# Patient Record
Sex: FEMALE | Race: WHITE | HISPANIC OR LATINO | Employment: UNEMPLOYED | URBAN - NONMETROPOLITAN AREA
[De-identification: names, ages, dates, MRNs, and addresses within clinical notes are randomized per-mention and may not be internally consistent; named-entity substitution may affect disease eponyms.]

---

## 2017-04-11 ENCOUNTER — HOSPITAL ENCOUNTER (EMERGENCY)
Facility: HOSPITAL | Age: 27
Discharge: HOME | End: 2017-04-11
Attending: EMERGENCY MEDICINE | Admitting: EMERGENCY MEDICINE
Payer: COMMERCIAL

## 2017-04-11 ENCOUNTER — APPOINTMENT (EMERGENCY)
Dept: RADIOLOGY | Facility: HOSPITAL | Age: 27
End: 2017-04-11
Payer: COMMERCIAL

## 2017-04-11 VITALS
WEIGHT: 173 LBS | RESPIRATION RATE: 18 BRPM | TEMPERATURE: 98.9 F | HEIGHT: 61 IN | DIASTOLIC BLOOD PRESSURE: 70 MMHG | SYSTOLIC BLOOD PRESSURE: 126 MMHG | BODY MASS INDEX: 32.66 KG/M2 | HEART RATE: 82 BPM | OXYGEN SATURATION: 99 %

## 2017-04-11 DIAGNOSIS — S93.402A LEFT ANKLE SPRAIN: Primary | ICD-10-CM

## 2017-04-11 PROCEDURE — 73610 X-RAY EXAM OF ANKLE: CPT

## 2017-04-11 PROCEDURE — 99283 EMERGENCY DEPT VISIT LOW MDM: CPT

## 2017-04-11 RX ORDER — NAPROXEN 500 MG/1
500 TABLET ORAL 2 TIMES DAILY WITH MEALS
Qty: 14 TABLET | Refills: 0 | Status: SHIPPED | OUTPATIENT
Start: 2017-04-11 | End: 2018-02-01 | Stop reason: ALTCHOICE

## 2017-04-11 RX ORDER — NAPROXEN 250 MG/1
500 TABLET ORAL ONCE
Status: COMPLETED | OUTPATIENT
Start: 2017-04-11 | End: 2017-04-11

## 2017-04-11 RX ADMIN — NAPROXEN 500 MG: 250 TABLET ORAL at 21:16

## 2017-07-28 ENCOUNTER — APPOINTMENT (OUTPATIENT)
Dept: LAB | Facility: CLINIC | Age: 27
End: 2017-07-28
Payer: COMMERCIAL

## 2017-07-28 ENCOUNTER — TRANSCRIBE ORDERS (OUTPATIENT)
Dept: LAB | Facility: CLINIC | Age: 27
End: 2017-07-28

## 2017-07-28 ENCOUNTER — ALLSCRIPTS OFFICE VISIT (OUTPATIENT)
Dept: OTHER | Facility: OTHER | Age: 27
End: 2017-07-28

## 2017-07-28 DIAGNOSIS — R10.2 PELVIC AND PERINEAL PAIN: ICD-10-CM

## 2017-07-28 DIAGNOSIS — K59.09 OTHER CONSTIPATION: ICD-10-CM

## 2017-07-28 DIAGNOSIS — Z00.00 ENCOUNTER FOR GENERAL ADULT MEDICAL EXAMINATION WITHOUT ABNORMAL FINDINGS: ICD-10-CM

## 2017-07-28 DIAGNOSIS — R39.11 HESITANCY OF MICTURITION: ICD-10-CM

## 2017-07-28 DIAGNOSIS — Z34.91 ENCOUNTER FOR SUPERVISION OF NORMAL PREGNANCY IN FIRST TRIMESTER: ICD-10-CM

## 2017-07-28 LAB
ALBUMIN SERPL BCP-MCNC: 3.7 G/DL (ref 3.5–5)
ALP SERPL-CCNC: 67 U/L (ref 46–116)
ALT SERPL W P-5'-P-CCNC: 19 U/L (ref 12–78)
ANION GAP SERPL CALCULATED.3IONS-SCNC: 8 MMOL/L (ref 4–13)
AST SERPL W P-5'-P-CCNC: 8 U/L (ref 5–45)
BASOPHILS # BLD AUTO: 0.03 THOUSANDS/ΜL (ref 0–0.1)
BASOPHILS NFR BLD AUTO: 0 % (ref 0–1)
BILIRUB SERPL-MCNC: 0.54 MG/DL (ref 0.2–1)
BILIRUB UR QL STRIP: NEGATIVE
BUN SERPL-MCNC: 7 MG/DL (ref 5–25)
CALCIUM SERPL-MCNC: 9 MG/DL (ref 8.3–10.1)
CHLORIDE SERPL-SCNC: 107 MMOL/L (ref 100–108)
CHOLEST SERPL-MCNC: 136 MG/DL (ref 50–200)
CLARITY UR: NORMAL
CO2 SERPL-SCNC: 23 MMOL/L (ref 21–32)
COLOR UR: YELLOW
CREAT SERPL-MCNC: 0.7 MG/DL (ref 0.6–1.3)
EOSINOPHIL # BLD AUTO: 0.05 THOUSAND/ΜL (ref 0–0.61)
EOSINOPHIL NFR BLD AUTO: 1 % (ref 0–6)
ERYTHROCYTE [DISTWIDTH] IN BLOOD BY AUTOMATED COUNT: 12.3 % (ref 11.6–15.1)
GFR SERPL CREATININE-BSD FRML MDRD: 122 ML/MIN/1.73SQ M
GLUCOSE (HISTORICAL): NEGATIVE
GLUCOSE P FAST SERPL-MCNC: 88 MG/DL (ref 65–99)
HCT VFR BLD AUTO: 39.7 % (ref 34.8–46.1)
HDLC SERPL-MCNC: 40 MG/DL (ref 40–60)
HGB BLD-MCNC: 13.2 G/DL (ref 11.5–15.4)
HGB UR QL STRIP.AUTO: NEGATIVE
KETONES UR STRIP-MCNC: NEGATIVE MG/DL
LDLC SERPL CALC-MCNC: 84 MG/DL (ref 0–100)
LEUKOCYTE ESTERASE UR QL STRIP: NEGATIVE
LYMPHOCYTES # BLD AUTO: 3.07 THOUSANDS/ΜL (ref 0.6–4.47)
LYMPHOCYTES NFR BLD AUTO: 33 % (ref 14–44)
MCH RBC QN AUTO: 27.9 PG (ref 26.8–34.3)
MCHC RBC AUTO-ENTMCNC: 33.2 G/DL (ref 31.4–37.4)
MCV RBC AUTO: 84 FL (ref 82–98)
MONOCYTES # BLD AUTO: 0.69 THOUSAND/ΜL (ref 0.17–1.22)
MONOCYTES NFR BLD AUTO: 7 % (ref 4–12)
NEUTROPHILS # BLD AUTO: 5.59 THOUSANDS/ΜL (ref 1.85–7.62)
NEUTS SEG NFR BLD AUTO: 59 % (ref 43–75)
NITRITE UR QL STRIP: NEGATIVE
NRBC BLD AUTO-RTO: 0 /100 WBCS
PH UR STRIP.AUTO: 6 [PH]
PLATELET # BLD AUTO: 291 THOUSANDS/UL (ref 149–390)
PMV BLD AUTO: 11.5 FL (ref 8.9–12.7)
POTASSIUM SERPL-SCNC: 3.8 MMOL/L (ref 3.5–5.3)
PROT SERPL-MCNC: 6.9 G/DL (ref 6.4–8.2)
PROT UR STRIP-MCNC: POSITIVE MG/DL
RBC # BLD AUTO: 4.73 MILLION/UL (ref 3.81–5.12)
SODIUM SERPL-SCNC: 138 MMOL/L (ref 136–145)
SP GR UR STRIP.AUTO: 1.03
TRIGL SERPL-MCNC: 60 MG/DL
TSH SERPL DL<=0.05 MIU/L-ACNC: 0.47 UIU/ML (ref 0.36–3.74)
UROBILINOGEN UR QL STRIP.AUTO: 0.2
WBC # BLD AUTO: 9.44 THOUSAND/UL (ref 4.31–10.16)

## 2017-07-28 PROCEDURE — 84443 ASSAY THYROID STIM HORMONE: CPT

## 2017-07-28 PROCEDURE — 80061 LIPID PANEL: CPT

## 2017-07-28 PROCEDURE — 85025 COMPLETE CBC W/AUTO DIFF WBC: CPT

## 2017-07-28 PROCEDURE — 80053 COMPREHEN METABOLIC PANEL: CPT

## 2017-07-28 PROCEDURE — 36415 COLL VENOUS BLD VENIPUNCTURE: CPT

## 2017-07-29 ENCOUNTER — GENERIC CONVERSION - ENCOUNTER (OUTPATIENT)
Dept: OTHER | Facility: OTHER | Age: 27
End: 2017-07-29

## 2017-08-14 ENCOUNTER — ALLSCRIPTS OFFICE VISIT (OUTPATIENT)
Dept: OTHER | Facility: OTHER | Age: 27
End: 2017-08-14

## 2017-08-18 ENCOUNTER — GENERIC CONVERSION - ENCOUNTER (OUTPATIENT)
Dept: OTHER | Facility: OTHER | Age: 27
End: 2017-08-18

## 2017-08-21 ENCOUNTER — ALLSCRIPTS OFFICE VISIT (OUTPATIENT)
Dept: OTHER | Facility: OTHER | Age: 27
End: 2017-08-21

## 2017-08-21 ENCOUNTER — GENERIC CONVERSION - ENCOUNTER (OUTPATIENT)
Dept: OTHER | Facility: OTHER | Age: 27
End: 2017-08-21

## 2017-08-24 ENCOUNTER — TRANSCRIBE ORDERS (OUTPATIENT)
Dept: LAB | Facility: CLINIC | Age: 27
End: 2017-08-24

## 2017-08-24 ENCOUNTER — APPOINTMENT (OUTPATIENT)
Dept: LAB | Facility: CLINIC | Age: 27
End: 2017-08-24
Payer: COMMERCIAL

## 2017-08-24 DIAGNOSIS — Z34.91 ENCOUNTER FOR SUPERVISION OF NORMAL PREGNANCY IN FIRST TRIMESTER: ICD-10-CM

## 2017-08-24 LAB
AMORPH URATE CRY URNS QL MICRO: ABNORMAL /HPF
BACTERIA UR QL AUTO: ABNORMAL /HPF
BASOPHILS # BLD AUTO: 0.02 THOUSANDS/ΜL (ref 0–0.1)
BASOPHILS NFR BLD AUTO: 0 % (ref 0–1)
BILIRUB UR QL STRIP: ABNORMAL
CLARITY UR: ABNORMAL
COLOR UR: ABNORMAL
EOSINOPHIL # BLD AUTO: 0.05 THOUSAND/ΜL (ref 0–0.61)
EOSINOPHIL NFR BLD AUTO: 1 % (ref 0–6)
ERYTHROCYTE [DISTWIDTH] IN BLOOD BY AUTOMATED COUNT: 12.1 % (ref 11.6–15.1)
GLUCOSE UR STRIP-MCNC: NEGATIVE MG/DL
HCT VFR BLD AUTO: 37.4 % (ref 34.8–46.1)
HGB BLD-MCNC: 12.7 G/DL (ref 11.5–15.4)
HGB UR QL STRIP.AUTO: NEGATIVE
KETONES UR STRIP-MCNC: ABNORMAL MG/DL
LEUKOCYTE ESTERASE UR QL STRIP: NEGATIVE
LYMPHOCYTES # BLD AUTO: 2.62 THOUSANDS/ΜL (ref 0.6–4.47)
LYMPHOCYTES NFR BLD AUTO: 25 % (ref 14–44)
MCH RBC QN AUTO: 28 PG (ref 26.8–34.3)
MCHC RBC AUTO-ENTMCNC: 34 G/DL (ref 31.4–37.4)
MCV RBC AUTO: 82 FL (ref 82–98)
MONOCYTES # BLD AUTO: 0.85 THOUSAND/ΜL (ref 0.17–1.22)
MONOCYTES NFR BLD AUTO: 8 % (ref 4–12)
MUCOUS THREADS UR QL AUTO: ABNORMAL
NEUTROPHILS # BLD AUTO: 6.95 THOUSANDS/ΜL (ref 1.85–7.62)
NEUTS SEG NFR BLD AUTO: 66 % (ref 43–75)
NITRITE UR QL STRIP: NEGATIVE
NON-SQ EPI CELLS URNS QL MICRO: ABNORMAL /HPF
NRBC BLD AUTO-RTO: 0 /100 WBCS
PH UR STRIP.AUTO: 6 [PH] (ref 4.5–8)
PLATELET # BLD AUTO: 280 THOUSANDS/UL (ref 149–390)
PMV BLD AUTO: 11.4 FL (ref 8.9–12.7)
PROT UR STRIP-MCNC: ABNORMAL MG/DL
RBC # BLD AUTO: 4.54 MILLION/UL (ref 3.81–5.12)
RBC #/AREA URNS AUTO: ABNORMAL /HPF
RUBV IGG SERPL IA-ACNC: 36.5 IU/ML
SP GR UR STRIP.AUTO: 1.03 (ref 1–1.03)
UROBILINOGEN UR QL STRIP.AUTO: 1 E.U./DL
WBC # BLD AUTO: 10.51 THOUSAND/UL (ref 4.31–10.16)
WBC #/AREA URNS AUTO: ABNORMAL /HPF

## 2017-08-24 PROCEDURE — 36415 COLL VENOUS BLD VENIPUNCTURE: CPT

## 2017-08-24 PROCEDURE — 81220 CFTR GENE COM VARIANTS: CPT

## 2017-08-24 PROCEDURE — 80081 OBSTETRIC PANEL INC HIV TSTG: CPT

## 2017-08-24 PROCEDURE — 87086 URINE CULTURE/COLONY COUNT: CPT

## 2017-08-24 PROCEDURE — 81001 URINALYSIS AUTO W/SCOPE: CPT

## 2017-08-25 ENCOUNTER — LAB REQUISITION (OUTPATIENT)
Dept: LAB | Facility: HOSPITAL | Age: 27
End: 2017-08-25
Payer: COMMERCIAL

## 2017-08-25 DIAGNOSIS — Z34.91 ENCOUNTER FOR SUPERVISION OF NORMAL PREGNANCY IN FIRST TRIMESTER: ICD-10-CM

## 2017-08-25 LAB
ABO GROUP BLD: NORMAL
BACTERIA UR CULT: NORMAL
BLD GP AB SCN SERPL QL: NEGATIVE
HBV SURFACE AG SER QL: NORMAL
RH BLD: POSITIVE
RPR SER QL: NORMAL
SPECIMEN EXPIRATION DATE: NORMAL

## 2017-08-27 LAB — HIV 1+2 AB+HIV1 P24 AG SERPL QL IA: NORMAL

## 2017-08-30 LAB
CF COMMENT: NORMAL
CFTR MUT ANL BLD/T: NORMAL

## 2017-08-31 ENCOUNTER — ALLSCRIPTS OFFICE VISIT (OUTPATIENT)
Dept: OTHER | Facility: OTHER | Age: 27
End: 2017-08-31

## 2017-08-31 LAB
GLUCOSE (HISTORICAL): NORMAL
PROT UR STRIP-MCNC: NORMAL MG/DL

## 2017-09-01 ENCOUNTER — LAB REQUISITION (OUTPATIENT)
Dept: LAB | Facility: HOSPITAL | Age: 27
End: 2017-09-01
Payer: COMMERCIAL

## 2017-09-01 DIAGNOSIS — Z72.51 HIGH RISK HETEROSEXUAL BEHAVIOR: ICD-10-CM

## 2017-09-01 LAB
CHLAMYDIA DNA CVX QL NAA+PROBE: NORMAL
N GONORRHOEA DNA GENITAL QL NAA+PROBE: NORMAL

## 2017-09-01 PROCEDURE — 87591 N.GONORRHOEAE DNA AMP PROB: CPT | Performed by: OBSTETRICS & GYNECOLOGY

## 2017-09-01 PROCEDURE — 87491 CHLMYD TRACH DNA AMP PROBE: CPT | Performed by: OBSTETRICS & GYNECOLOGY

## 2017-09-06 ENCOUNTER — GENERIC CONVERSION - ENCOUNTER (OUTPATIENT)
Dept: OTHER | Facility: OTHER | Age: 27
End: 2017-09-06

## 2017-09-20 ENCOUNTER — GENERIC CONVERSION - ENCOUNTER (OUTPATIENT)
Dept: OTHER | Facility: OTHER | Age: 27
End: 2017-09-20

## 2017-09-20 ENCOUNTER — ALLSCRIPTS OFFICE VISIT (OUTPATIENT)
Dept: PERINATAL CARE | Facility: CLINIC | Age: 27
End: 2017-09-20
Payer: COMMERCIAL

## 2017-09-20 PROCEDURE — 76813 OB US NUCHAL MEAS 1 GEST: CPT | Performed by: OBSTETRICS & GYNECOLOGY

## 2017-09-27 ENCOUNTER — LAB CONVERSION - ENCOUNTER (OUTPATIENT)
Dept: OTHER | Facility: OTHER | Age: 27
End: 2017-09-27

## 2017-09-27 LAB
AGE RISK DOWN SYNDROME (HISTORICAL): NORMAL
CALC'D GESTATIONAL AGE (HISTORICAL): 13.4
COLLECTION DATE (HISTORICAL): NORMAL
CROWN RUMP LENGTH (HISTORICAL): 72 MM
CROWN RUMP LENGTH (HISTORICAL): NORMAL MM
DATE OF BIRTH (HISTORICAL): NORMAL
DONOR AGE; EGG RETRIEVAL (HISTORICAL): NORMAL
DONOR EGG (HISTORICAL): NO
EDD DETERMINED BY (HISTORICAL): NORMAL
ESTIMATED DELIVERY DATE (EDD) (HISTORICAL): NORMAL
HCG MOM (HISTORICAL): 0.41
HCG QUANTITATIVE (HISTORICAL): 28.4 IU/ML
HX OF NEURAL TUBE DEFECTS (HISTORICAL): NO
IF TWINS (HISTORICAL): NORMAL
INSULIN DEP. DIABETIC (HISTORICAL): NO
INTERPRETATION (HISTORICAL): NORMAL
MATERNAL WEIGHT (HISTORICAL): 160 LBS
MSS DOWN SYNDROME RISK (HISTORICAL): NORMAL
MSS3 TRISOMY 18 RISK (HISTORICAL): NORMAL
NASAL BONE (HISTORICAL): NORMAL
NASAL BONE (HISTORICAL): PRESENT
NT MOM (HISTORICAL): 1.32
NTQR LOCATION ID (HISTORICAL): NORMAL
NTQR READING PHYS ID (HISTORICAL): NORMAL
NUCHAL TRANSLUCENCY (HISTORICAL): 2.1 MM
NUCHAL TRANSLUCENCY (HISTORICAL): NORMAL MM
NUMBER OF FETUSES (HISTORICAL): 1
PAPP-A (HISTORICAL): 1.04
PAPP-A (HISTORICAL): 1173.5 NG/ML
PREV PREGNANCY DOWN SYND (HISTORICAL): NO
RACE/ETHNIC ORIGIN (HISTORICAL): NORMAL
REFERRING PHYSICIAN (HISTORICAL): NORMAL
REFERRING PHYSICIAN NPI (HISTORICAL): NORMAL
REFERRING PHYSICIAN PHONE (HISTORICAL): NORMAL
REPEAT SPECIMEN (HISTORICAL): NO
ULTRASONOGRAPHER ID (HISTORICAL): NORMAL
ULTRASOUND DATE (HISTORICAL): NORMAL

## 2017-09-28 ENCOUNTER — GENERIC CONVERSION - ENCOUNTER (OUTPATIENT)
Dept: OTHER | Facility: OTHER | Age: 27
End: 2017-09-28

## 2017-10-04 ENCOUNTER — GENERIC CONVERSION - ENCOUNTER (OUTPATIENT)
Dept: OTHER | Facility: OTHER | Age: 27
End: 2017-10-04

## 2017-10-26 ENCOUNTER — GENERIC CONVERSION - ENCOUNTER (OUTPATIENT)
Dept: OTHER | Facility: OTHER | Age: 27
End: 2017-10-26

## 2017-11-09 ENCOUNTER — APPOINTMENT (OUTPATIENT)
Dept: PERINATAL CARE | Facility: CLINIC | Age: 27
End: 2017-11-09
Payer: COMMERCIAL

## 2017-11-09 ENCOUNTER — GENERIC CONVERSION - ENCOUNTER (OUTPATIENT)
Dept: OTHER | Facility: OTHER | Age: 27
End: 2017-11-09

## 2017-11-09 PROCEDURE — 76817 TRANSVAGINAL US OBSTETRIC: CPT | Performed by: OBSTETRICS & GYNECOLOGY

## 2017-11-09 PROCEDURE — 76811 OB US DETAILED SNGL FETUS: CPT | Performed by: OBSTETRICS & GYNECOLOGY

## 2017-11-10 ENCOUNTER — GENERIC CONVERSION - ENCOUNTER (OUTPATIENT)
Dept: OTHER | Facility: OTHER | Age: 27
End: 2017-11-10

## 2017-11-10 ENCOUNTER — LAB CONVERSION - ENCOUNTER (OUTPATIENT)
Dept: OTHER | Facility: OTHER | Age: 27
End: 2017-11-10

## 2017-11-10 LAB
AFP (HISTORICAL): 1.34
AFP (HISTORICAL): 71 NG/ML
AGE RISK DOWN SYNDROME (HISTORICAL): NORMAL
CALC'D GESTATIONAL AGE (HISTORICAL): 20
COLLECTION DATE (HISTORICAL): NORMAL
CROWN RUMP LENGTH (HISTORICAL): 72 MM
DATE OF BIRTH (HISTORICAL): NORMAL
ESTIMATED DELIVERY DATE (EDD) (HISTORICAL): NORMAL
ESTRADIOL, FREE (HISTORICAL): 2.86 NG/ML
ESTRIOL MOM (HISTORICAL): 1.62
HCG MOM (HISTORICAL): 0.41
HCG QUANTITATIVE (HISTORICAL): 7.8 IU/ML
HX OF NEURAL TUBE DEFECTS (HISTORICAL): NO
INHIBIN A (HISTORICAL): 187 PG/ML
INHIBIN A MOM (HISTORICAL): 1.15
INSULIN DEP. DIABETIC (HISTORICAL): NO
INTERPRETATION (HISTORICAL): NORMAL
MATERNAL WEIGHT (HISTORICAL): 162 LBS
MSAFP RISK OPEN NTD (HISTORICAL): NORMAL
MSS DOWN SYNDROME RISK (HISTORICAL): NORMAL
MSS3 TRISOMY 18 RISK (HISTORICAL): NORMAL
NASAL BONE (HISTORICAL): NORMAL
NASAL BONE (HISTORICAL): PRESENT
NT MOM (HISTORICAL): 1.32
NUCHAL TRANSLUCENCY (HISTORICAL): 2.1 MM
NUMBER OF FETUSES (HISTORICAL): 1
PAPP-A (HISTORICAL): 1.06
PAPP-A (HISTORICAL): 1173.5 NG/ML
RACE/ETHNIC ORIGIN (HISTORICAL): NORMAL
REFERRING PHYSICIAN (HISTORICAL): NORMAL
REFERRING PHYSICIAN NPI (HISTORICAL): NORMAL
REFERRING PHYSICIAN PHONE (HISTORICAL): NORMAL
REPEAT SPECIMEN (HISTORICAL): NO
SPECIMEN: (HISTORICAL): NORMAL
ULTRASOUND DATE (HISTORICAL): NORMAL

## 2017-11-22 ENCOUNTER — ALLSCRIPTS OFFICE VISIT (OUTPATIENT)
Dept: OTHER | Facility: OTHER | Age: 27
End: 2017-11-22

## 2017-11-22 LAB
GLUCOSE (HISTORICAL): NORMAL
PROT UR STRIP-MCNC: NORMAL MG/DL

## 2017-12-20 ENCOUNTER — ALLSCRIPTS OFFICE VISIT (OUTPATIENT)
Dept: OTHER | Facility: OTHER | Age: 27
End: 2017-12-20

## 2017-12-20 LAB
GLUCOSE (HISTORICAL): NORMAL
PROT UR STRIP-MCNC: 1 MG/DL

## 2018-01-09 NOTE — MISCELLANEOUS
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

## 2018-01-10 NOTE — RESULT NOTES
Verified Results  (Q) STEPWISE, PART 2 [id number] 12:03PM Bhavani Ramirez   REPORT COMMENT:  FASTING:NO     Test Name Result Flag Reference   INTERPRETATION SEE NOTE     SCREEN NEGATIVE FOR OPEN NTD, DOWN SYNDROME AND TRISOMY 18   NT WAS USED IN THE RISK CALCULATIONS  RISK FOR ONTD 1:3400     AGE RISK DOWN SYNDROME 1:1000     KUSUM DOWN SYNDROME RISK <1:5000  <1:270   RISK FOR TRISOMY 18 <1:5000  <1:100   CALCULATED GESTATIONAL$AGE 20 0     Crown rump length (CRL) was used to calculate gestational  age  LIANA, if provided, was not used for gestational age  dating  AFP, SERUM 71 0 ng/mL     AFP MOM 1 34     Reference Range:                                        <2 50                                        IDD        <1 90                                        TWINS      <4 00                                        TWINS IDD  <3 50                                        TRIPLETS   <4 50   HCG, SERUM 7 8 IU/mL     HCG MOM 0 41     ESTRIOL, FREE 2 86 ng/mL     ESTRIOL MOM 1 62     INHIBIN A, DIMERIC 187 pg/mL     INHIBIN A MOM 1 15     CLARENCE A 1173 5 ng/mL     This test was performed using a kit that has not been  cleared or approved by the FDA  The analytical performance  characteristics of this test have been determined by [facility]  This test  should not be used for diagnosis without confirmation by  other medically established means  CLARENCE-A MOM 1 06     NT MOM 1 32     The maternal serum screening results indicate a lower risk  of trisomy 21 in this pregnancy  The nasal bone was assessed  via ultrasound and was present  The combined risk is  therefore likely to be less than the calculated risk  Other  findings later in the pregnancy may change the risk  Nasal bone assessment is best accomplished through a fetal  ultrasound performed between 11 weeks 0 days through 13  weeks 6 days   In assessing the risk for aneuploidy, the  evaluation of the maternal serum markers plus the nuchal  thickness measurement is calculated first  Any potential  change to the patient's risk for aneuploidy depends on the  nuchal thickness, crown-rump length, and the ethnic origin,  and therefore the values generated by the algorithm itself  will not change  Additional information about the assessment  of the fetal nasal bone may be found on the [facility] [facility] website at  http://www  [employer]fmf/training-certification/cert  erdqaxma-zc-utxzo  tence/11-13-week-scan/assessment-of-the-nasal-bone/  The Sequential Integrated Screen combines CLARENCE-A and hCG  with or without a nuchal translucency measurement in the  first trimester with AFP, unconjugated estriol, intact hCG  and Inhibin A in the second trimester  This provides a  useful screening test for detection of open neural tube  defects, Down syndrome and Trisomy 18  It should be noted  that normal results can never guarantee the birth of a  normal baby and that 2 to 3 percent of newborns have some  type of physical or mental defect, many of which are  undetectable through any known prenatal diagnostic  technique  REFERRING PHYSICIAN NAME [facility]     REFERRING PHYSICIAN PHONE [phone]     REFERRING PHYSICIAN NPI [id number]     SPECIMEN # FROM PART 1 [id number]     DATE OF BIRTH 1990     COLLECTION DATE 11/07/2017     LIANA: 03/23/2018     NUCHAL TRANSLUCENCY 2 1 mm     MOTHER'S ETHNIC ORIGIN      INSULIN DEPEND DIABETIC NO     REPEAT SPECIMEN NO     NUMBER OF FETUSES 1     HX OF NEURAL TUBE DEFECTS NO     MATERNAL WEIGHT 162 lbs     Crown Rump Length 72 mm     Ultrasound Date 09/20/2017     Nasal Bone PRESENT     Twin B Nasal Bone NOT GIVEN     For additional information, please refer to  http://[employer]/faq/FAQ95  (This link is provided for more informational/educational  purposes only )     This is a screening test, not a diagnostic test      This risk assessment is based on demographic data provided  by the ordering physician  Please notify the laboratory  promptly if any data are incorrect  It has been observed that patients who smoke cigarettes  during pregnancy may have a slightly increased risk of  having a false positive KUSUM screen for Down Syndrome or  trisomy 18  If you have questions concerning this report: For clinical consultation, call [phone]; For technical questions, call [phone] ext [phone]; For recalculations, fax to [phone]

## 2018-01-11 NOTE — PROGRESS NOTES
SEP 20 2017         RE: Ronan Tejeda                                To: YESICA BOOGIE [facility]   MR#: [id number]                                    [address] [place] [address]   : SEP [address] Gisselle Angeles  ENC: [id number]                             [place], [address]   (Exam #: [id number])                           Fax: [phone]      The LMP of this 24year old,  G3, P1-0-1-1 patient was unknown, her   working LIANA is MAR 21 2018 and the current gestational age is 17 weeks 5   days by [address] [place] [address]  A sonographic examination was performed on SEP   20 2017 using real time equipment  The ultrasound examination was   performed using abdominal technique  The patient has a BMI of 31 4  Her   blood pressure today was 132/67  Earliest US on record:   17  10w6d   18  LIANA      Christoph Mccullough presents today for sequential screening  This is her third   pregnancy  She has a history of one termination and a previous full-term   vaginal delivery without complications  She has no significant medical or   surgical history otherwise  She denies the current use of tobacco,   alcohol, or drugs  She currently takes no medications and has no known   drug allergies  Her family medical history is unremarkable  A review of   systems is otherwise negative  On exam, the patient appears well, in no   acute distress, and her abdomen is nontender  Multiple longitudinal and transverse sections revealed a mtz   intrauterine pregnancy with the fetus in variable presentation  The   placenta is posterior in implantation, and there is no placenta previa        Cardiac motion was observed at 152 bpm       INDICATIONS      first trimester genetic screening   obesity      Exam Types      Level I      RESULTS      Fetus # 1 of 1   Variable presentation      MEASUREMENTS (* Included In Average GA)      CRL              7 2 cm        13 weeks 1 day *   Nuchal Trans    2 10 mm      THE AVERAGE GESTATIONAL AGE is 13 weeks 1 day +/- 7 days  ANATOMY COMMENTS      Anatomic detail is limited at this gestational age  The yolk sac was not   noted  The fetal cranium appeared normal in shape and the nuchal   translucency was normal in size (2 1mm)  The nasal bone appears to be   present  The intracranial anatomy was unremarkable  Evaluation of the   spine revealed no obvious evidence for a neural tube defect  Anatomy of   the fetal thorax appeared within normal limits  The cardiac rhythm was   regular  Within the abdomen, stomach & bladder were visualized and the   abdominal wall appeared intact  A three vessel cord appears to be present  Active movement of the fetal body & extremities was seen  There is no   suspicion of a subchorionic bleed  The placental cord insertion was   normal     There is no suspicion of a uterine myoma  Free fluid is not   seen in the posterior cul-de-sac  ADNEXA      The left ovary appeared normal and measured 3 1 x 3 2 x 2 6 cm with a   volume of 13 5 cc  The right ovary appeared normal and measured 2 3 x 2 8   x 1 2 cm with a volume of 4 0 cc       AMNIOTIC FLUID      Amniotic Fluid: Normal      IMPRESSION      Santos IUP   13 weeks and 1 day by this ultrasound  (LIANA=MAR 27 2018)   13 weeks and 5 days by 1st Tri Sono  (LIANA=MAR 23 2018)   Variable presentation   Regular fetal heart rate of 152 bpm   Posterior placenta   No placenta previa      GENERAL COMMENT      We discussed the options for genetic screening, including but not limited   to first trimester screening, second trimester screening, combined first   and second trimester screening, noninvasive prenatal testing (NIPT) for   patients at high risk and diagnostic screening through the use of CVS and   amniocentesis    We discussed the risks and benefits of each approach   including the sensitivities and false positive rates as well as the difference between a screening test and a diagnostic test   At the   conclusion of our discussion the patient elected Stepwise Sequential   Screening to delineate her risk for fetal aneuploidy  She was given a   requisition to go to [facility] to have the first trimester blood work drawn  The first trimester portion of the screening results, encompassing age,   nuchal translucency, and biochemistry should be available within one week   of testing and will be reported from [facility]   The second stage of   sequential screening should be completed between the 15th and 21st week of   pregnancy (ideally between 16-18 weeks)  We discussed follow-up in detail and I recommend an anatomy ultrasound be   scheduled for 20 weeks gestation  Thank you very much for allowing us to participate in the care of this   very nice patient  Should you have any questions, please do not hesitate   to contact our office  Please note, in addition to the time spent discussing the results of the   ultrasound, I spent approximately 10 minutes of face-to-face time with the   patient, greater than 50% of which was spent in counseling and the   coordination of care for this patient  Portions of the record may have been created with voice recognition   software  Occasional wrong word or "sound a like" substitutions may have   occurred due to the inherent limitations of voice recognition software  Read the chart carefully and recognize, using context, where substitutions   have occurred  WILLEM Steiner M D     Electronically signed 09/20/17 14:50

## 2018-01-12 NOTE — PROGRESS NOTES
2017         RE: Hollie Pollard                                To: [address]   MR#: [id number]                                    [address] [place] [address]   :  [facility]  ENC: [id number]                             [place], [address] [place] [address]   (Exam #: [id number])                           Fax: [phone]      The LMP of this 24year old,  G3, P1-0-1-1 patient was unknown, her   working LIANA is MAR 21 2018 and the current gestational age is 25 weeks 6   days by [address] [place] [address]  A sonographic examination was performed on 2017 using real time equipment  The ultrasound examination was performed   using abdominal & vaginal techniques  The patient has a BMI of 32 4  Her   blood pressure today was 124/78  Earliest US on record:   17  10w6d   18  LIANA      Cardiac motion was observed at 134 bpm       INDICATIONS      fetal anatomical survey   obesity      Exam Types      LEVEL II   Transvaginal      RESULTS      Fetus # 1 of 1   Variable presentation   Placenta Location = Posterior   No placenta previa   Placenta Grade = I      MEASUREMENTS (* Included In Average GA)      AC              15 3 cm        20 weeks 1 day * (37%)   BPD              4 6 cm        20 weeks 0 days* (26%)   HC              17 4 cm        19 weeks 6 days* (20%)   Femur            3 5 cm        21 weeks 1 day * (52%)      Nuchal Fold      4 0 mm   NBL              7 5 mm      Humerus          3 5 cm        21 weeks 6 days  (67%)      Cerebellum       2 1 cm        20 weeks 4 days   Biorbit          3 3 cm        20 weeks 6 days   CisternaMagna    3 6 mm      HC/AC           1 14   FL/AC           0 23   FL/BPD          0 75   EFW (Ac/Fl/Hc)   364 grams - 0 lbs 13 oz      THE AVERAGE GESTATIONAL AGE is 20 weeks 2 days +/- 10 days        AMNIOTIC FLUID         Largest Vertical Pocket = 3 4 cm   Amniotic Fluid: Normal CERVICAL EVALUATION      SUPINE      Cervical Length: 4 40 cm      OTHER TEST RESULTS           Funneling?: No             Dynamic Changes?: No        Resp  To TFP?: No      ANATOMY      Head                                    Normal   Face/Neck                               Normal   Th  Cav  Normal   Heart                                   Normal   Abd  Cav  Normal   Stomach                                 Normal   Right Kidney                            Normal   Left Kidney                             Normal   Bladder                                 Normal   Abd  Wall                               Normal   Spine                                   Normal   Extrems                                 Normal   Genitalia                               Normal   Placenta                                Normal   Umbl  Cord                              Normal   Uterus                                  Normal   PCI                                     Normal      ANATOMY DETAILS      Visualized Appearing Sonographically Normal:   HEAD: (Calvarium, BPD Level, Cavum, Lateral Ventricles, Choroid Plexus,   Cerebellum, Cisterna Magna);    FACE/NECK: (Neck, Nuchal Fold, Profile,   Orbits, Nose/Lips, Palate, Face);    TH  CAV  : (Lungs, Diaphragm); HEART: (Four Chamber View, Proximal Left Outflow, Proximal Right Outflow,   3VV, 3 Vessel Trachea, Short Axis of Greater Vessels, Ductal Arch, Aortic   Arch, Interventricular Septum, Interatrial Septum, IVC, SVC, Cardiac Axis,   Cardiac Position);    ABD  CAV : (Liver);    STOMACH, RIGHT KIDNEY, LEFT   KIDNEY, BLADDER, ABD  WALL, SPINE: (Cervical Spine, Thoracic Spine, Lumbar   Spine, Sacrum);    EXTREMS: (Lt Humerus, Rt Humerus, Lt Forearm, Rt   Forearm, Lt Hand, Rt Hand, Lt Femur, Rt Femur, Lt Low Leg, Rt Low Leg, Lt   Foot, Rt Foot);    GENITALIA (Male), PLACENTA, UMBL   CORD, UTERUS, PCI      ADNEXA      The left ovary appeared normal and measured 3 1 x 1 9 x 1 5 cm with a   volume of 4 6 cc  The right ovary appeared normal and measured 2 6 x 2 1 x   1 7 cm with a volume of 4 9 cc  IMPRESSION      Santos IUP   20 weeks and 2 days by this ultrasound  (LIANA=MAR 27 2018)   20 weeks and 6 days by 1st Tri Sono  (LIANA=MAR 23 2018)   Variable presentation   Normal anatomy survey   Regular fetal heart rate of 134 bpm   Posterior placenta   No placenta previa      GENERAL COMMENT      On exam today the patient appears well, in no acute distress, and denies   any complaints  Her abdomen is non-tender  The patient had Stepwise Sequential Screening at our Center reported   through [facility]  She only completed the first trimester   portion  She states she just recently had her blood work completed for   the second trimester portion although the results are not yet available   Her risk for trisomy 21 before screening was 1:760, after screening her   risk is <1:5000; her risk for Trisomy 18 was <1:5000  The fetal anatomic survey is complete  There is no sonographic evidence   of fetal abnormalities at this time  Good fetal movement and tone are   seen  The amniotic fluid volume appears normal   The placenta is   posterior and it appears sonographically normal   A transvaginal   ultrasound was performed to assess the cervix, which was not seen well   transabdominally  The cervical length was 4 4 centimeters, which is   normal for the current gestational age  There was no significant   funneling or dynamic changes appreciated  The patient was informed of   today's findings and all of her questions were answered  The limitations   of ultrasound were reviewed with the patient, which she accepts        We discussed follow-up in detail and I recommend the patient return in 12   weeks for a fetal growth evaluation for the indication of maternal obesity      Please note, in addition to the time spent discussing the results of the   ultrasound, I spent approximately 10 minutes of face-to-face time with the   patient, greater than 50% of which was spent in counseling and the   coordination of care for this patient  Thank you very much for allowing us to participate in the care of this   very nice patient  Should you have any questions, please do not hesitate   to contact our office  WILLEM Kinsey M D     Electronically signed 11/09/17 18:54

## 2018-01-13 VITALS
HEIGHT: 60 IN | DIASTOLIC BLOOD PRESSURE: 67 MMHG | WEIGHT: 161 LBS | SYSTOLIC BLOOD PRESSURE: 132 MMHG | BODY MASS INDEX: 31.61 KG/M2

## 2018-01-13 NOTE — RESULT NOTES
Verified Results  (1) CBC/PLT/DIFF [id number] 11:44AM Dereck Brito Order Number: [id number]     Test Name Result Flag Reference   WBC COUNT 9 44 Thousand/uL  4 31-10 16   RBC COUNT 4 73 Million/uL  3 81-5 12   HEMOGLOBIN 13 2 g/dL  11 5-15 4   HEMATOCRIT 39 7 %  34 8-46  1   MCV 84 fL  82-98   MCH 27 9 pg  26 8-34 3   MCHC 33 2 g/dL  31 4-37 4   RDW 12 3 %  11 6-15 1   MPV 11 5 fL  8 9-12 7   PLATELET COUNT 040 Thousands/uL  149-390   nRBC AUTOMATED 0 /100 WBCs     NEUTROPHILS RELATIVE PERCENT 59 %  43-75   LYMPHOCYTES RELATIVE PERCENT 33 %  14-44   MONOCYTES RELATIVE PERCENT 7 %  4-12   EOSINOPHILS RELATIVE PERCENT 1 %  0-6   BASOPHILS RELATIVE PERCENT 0 %  0-1   NEUTROPHILS ABSOLUTE COUNT 5 59 Thousands/? ??L  1 85-7 62   LYMPHOCYTES ABSOLUTE COUNT 3 07 Thousands/? ??L  0 60-4 47   MONOCYTES ABSOLUTE COUNT 0 69 Thousand/? ??L  0 17-1 22   EOSINOPHILS ABSOLUTE COUNT 0 05 Thousand/? ??L  0 00-0 61   BASOPHILS ABSOLUTE COUNT 0 03 Thousands/? ??L  0 00-0 10     (1) COMPREHENSIVE METABOLIC PANEL [id number] 54:36WI Dereck Brito Order Number: [id number]     Test Name Result Flag Reference   SODIUM 138 mmol/L  136-145   POTASSIUM 3 8 mmol/L  3 5-5 3   CHLORIDE 107 mmol/L  100-108   CARBON DIOXIDE 23 mmol/L  21-32   ANION GAP (CALC) 8 mmol/L  4-13   BLOOD UREA NITROGEN 7 mg/dL  5-25   CREATININE 0 70 mg/dL  0 60-1 30   Standardized to IDMS reference method   CALCIUM 9 0 mg/dL  8 3-10 1   BILI, TOTAL 0 54 mg/dL  0 20-1 00   ALK PHOSPHATAS 67 U/L     ALT (SGPT) 19 U/L  12-78   AST(SGOT) 8 U/L  5-45   ALBUMIN 3 7 g/dL  3 5-5 0   TOTAL PROTEIN 6 9 g/dL  6 4-8 2   eGFR 122 ml/min/1 73sq m     National Kidney Disease Education Program recommendations are as follows:  GFR calculation is accurate only with a steady state creatinine  Chronic Kidney disease less than 60 ml/min/1 73 sq  meters  Kidney failure less than 15 ml/min/1 73 sq  meters     GLUCOSE FASTING 88 mg/dL  65-99     (1) TSH [id number] 11: 92OF Arianne Hanks Order Number: [id number]     Test Name Result Flag Reference   TSH 0 468 uIU/mL  0 358-3 740   Patients undergoing fluorescein dye angiography may retain small amounts of fluorescein in the body for 48-72 hours post procedure  Samples containing fluorescein can produce falsely depressed TSH values  If the patient had this procedure,a specimen should be resubmitted post fluorescein clearance  The recommended reference ranges for TSH during pregnancy are as follows:  First trimester 0 1 to 2 5 uIU/mL  Second trimester  0 2 to 3 0 uIU/mL  Third trimester 0 3 to 3 0 uIU/m     (1) LIPID PANEL FASTING W DIRECT LDL REFLEX [id number] 11:44AM Arianne Hanks Order Number: [id number]     Test Name Result Flag Reference   CHOLESTEROL 136 mg/dL     LDL CHOLESTEROL CALCULATED 84 mg/dL  0-100   Triglyceride:         Normal              <150 mg/dl       Borderline High    150-199 mg/dl       High               200-499 mg/dl       Very High          >499 mg/dl  Cholesterol:         Desirable        <200 mg/dl      Borderline High  200-239 mg/dl      High             >239 mg/dl  HDL Cholesterol:        High    >59 mg/dL      Low     <41 mg/dL  LDL Cholesterol:        Optimal          <100 mg/dl        Near Optimal     100-129 mg/dl        Above Optimal          Borderline High   130-159 mg/dl          High              160-189 mg/dl          Very High        >189 mg/dl  LDL CALCULATED:    This screening LDL is a calculated result  It does not have the accuracy of the Direct Measured LDL in the monitoring of patients with hyperlipidemia and/or statin therapy  Direct Measure LDL ([id number]) must be ordered separately in these patients  TRIGLYCERIDES 60 mg/dL  <=150   Specimen collection should occur prior to N-Acetylcysteine or Metamizole administration due to the potential for falsely depressed results     HDL,DIRECT 40 mg/dL  40-60   Specimen collection should occur prior to Metamizole administration due to the potential for falsely depressed results

## 2018-01-14 VITALS
BODY MASS INDEX: 31.8 KG/M2 | SYSTOLIC BLOOD PRESSURE: 116 MMHG | HEIGHT: 60 IN | HEIGHT: 60 IN | HEART RATE: 78 BPM | TEMPERATURE: 98.2 F | WEIGHT: 162 LBS | DIASTOLIC BLOOD PRESSURE: 68 MMHG | DIASTOLIC BLOOD PRESSURE: 82 MMHG | BODY MASS INDEX: 31.8 KG/M2 | WEIGHT: 162 LBS | SYSTOLIC BLOOD PRESSURE: 128 MMHG | RESPIRATION RATE: 16 BRPM

## 2018-01-14 VITALS
SYSTOLIC BLOOD PRESSURE: 112 MMHG | HEIGHT: 60 IN | DIASTOLIC BLOOD PRESSURE: 76 MMHG | TEMPERATURE: 97.8 F | RESPIRATION RATE: 18 BRPM | WEIGHT: 163 LBS | HEART RATE: 88 BPM | BODY MASS INDEX: 32 KG/M2

## 2018-01-14 VITALS
DIASTOLIC BLOOD PRESSURE: 72 MMHG | WEIGHT: 166 LBS | HEART RATE: 102 BPM | BODY MASS INDEX: 32.42 KG/M2 | SYSTOLIC BLOOD PRESSURE: 118 MMHG

## 2018-01-16 NOTE — PROGRESS NOTES
Assessment    1  Encounter for routine gynecological examination with Papanicolaou smear of cervix   (V72 31,V76 2) (X77 925,A27 5)   2  Screen for STD (sexually transmitted disease) (V74 5) (Z11 3)    Plan  Encounter for routine gynecological examination with Papanicolaou smear of cervix    · (Q) THINPREP PAP; Status:Active; Requested JOSE G:[id number];    Perform:[facility]; IFW:[id number];[id number];  Homer Pyo for routine gynecological examination with Papanicolaou smear of cervix; Ordered By:Gertrudis Castelan; Health Maintenance    · Follow-up visit in 1 year Evaluation and Treatment  Follow-up  Status: Hold For -  Scheduling  Requested for: 20Jan2016   Ordered; For: Health Maintenance; Ordered By: Jasvir Diego Performed:  Due: [id number]  Screen for STD (sexually transmitted disease)    · (Q) CHLAMYDIA/N  GONORRHOEAE DNA, SDA; Status:Active; Requested  HMQ:[id number];    Perform:[facility]; OUU:[id number];YGKLXZD; For:Screen for STD (sexually transmitted disease); Ordered By:Gertrudis Castelan; Discussion/Summary  health maintenance visit Currently, she eats a healthy diet and has an adequate exercise regimen  the risks and benefits of cervical cancer screening were discussed Pap test was done today Testing was done today for chlamydia and gonorrhea  Breast cancer screening: the risks and benefits of breast cancer screening were discussed, self breast exam technique was taught and monthly self breast exam was advised  Advice and education were given regarding nutrition, aerobic exercise, weight bearing exercise, weight loss, contraception, sunscreen use and seat belt use  Patient discussion: discussed with the patient  Pt verbalizederstanding of all discussed  Chief Complaint    ANNUAL      History of Present Illness  HPI: Last annual exam and PAP before birth of her child   GYN HM, Adult Female [facility]: The patient is being seen for a health maintenance evaluation   The last health maintenance visit was 4 year(s) ago  General Health: The patient's health since the last visit is described as good  She has regular dental visits  She denies vision problems  She denies hearing loss  Lifestyle:  She consumes a diverse and healthy diet  She has weight concerns  She exercises regularly  She does not use tobacco  She denies alcohol use  She denies drug use  Reproductive health: the patient is premenopausal   she reports no menstrual problems  she uses contraception  For contraception, she uses condoms  she is sexually active  She is monogamous with a male partner and 1 partner x 9 yrs  pregnancy history: G 1P 1, 1  Screening: cancer screening reviewed and updated  Breast cancer screening includes uncertain timing of her last mammogram    Hospital Based Practices Required Assessment:   Pain Assessment   the patient states they do not have pain  Abuse And Domestic Violence Screen   The patient states no one is hurting them  Depression And Suicide Screen  No, the patient has not had thoughts of hurting themself  No, the patient has not felt depressed in the past 7 days  Prefered Language is  [place]  Primary Language is  English  Readiness To Learn: Receptive  Barriers To Learning: none  Preferred Learning: verbal   Education Completed: disease/condition, further treatment/follow-up and treatment/procedure   Teaching Method: verbal   Person Taught: patient      OB History  Pregnancy History (Brief):   Prior pregnancies: : 1   Para: 1 (full-term) and 1 (living)   Delivery type: 1 vaginal       Past Medical History    · History of  (212 90)   · History of Encounter for IUD removal (V25 12) (Z30 432)   · History of Female pelvic pain (625 9) (R10 2)   · History of Misplaced IUD (996 32)   · History of Previous Spontaneous Vaginal Delivery    Family History    · No pertinent family history    Social History    · Denied: History of Alcohol Use (History)   · Denied: History of Drug Use   · Never A Smoker    Allergies    1  No Known Drug Allergies    Vitals   Recorded: 54GUP2611 32:21UE   Systolic 404   Diastolic 74   Height 5 ft    Weight 182 lb    BMI Calculated 35 54   BSA Calculated 1 79   LMP [id number]     Physical Exam    Constitutional   General appearance: No acute distress, well appearing and well nourished  Neck   Neck: Normal, supple, trachea midline, no masses  Thyroid: Normal, no thyromegaly  Pulmonary   Respiratory effort: No increased work of breathing or signs of respiratory distress  Auscultation of lungs: Clear to auscultation  Cardiovascular   Auscultation of heart: Normal rate and rhythm, normal S1 and S2, no murmurs  Genitourinary   External genitalia: Normal and no lesions appreciated  Vagina: Normal, no lesions or dryness appreciated  Urethral meatus: Normal     Cervix: Normal, no palpable masses  Uterus: Normal, non-tender, not enlarged, and no palpable masses  Adnexa/parametria: Normal, non-tender and no fullness or masses appreciated  Chest   Breasts: Normal and no dimpling or skin changes noted  Abdomen   Abdomen: Normal, non-tender, and no organomegaly noted  Psychiatric   Orientation to person, place, and time: Normal     Mood and affect: Normal        Signatures   Electronically signed by :  TENA Shrestha; Jan 20 2016  3:20PM EST                       (Author)    Electronically signed by : Laureano Aguilar MD; Jan 25 2016  1:56PM EST

## 2018-01-17 ENCOUNTER — OB ABSTRACT (OUTPATIENT)
Dept: OBGYN CLINIC | Facility: CLINIC | Age: 28
End: 2018-01-17

## 2018-01-17 RX ORDER — DOCUSATE SODIUM 100 MG/1
100 CAPSULE, LIQUID FILLED ORAL 2 TIMES DAILY
COMMUNITY

## 2018-01-17 RX ORDER — SWAB
1 SWAB, NON-MEDICATED MISCELLANEOUS DAILY
COMMUNITY

## 2018-01-18 ENCOUNTER — OB ABSTRACT (OUTPATIENT)
Dept: OBGYN CLINIC | Facility: CLINIC | Age: 28
End: 2018-01-18

## 2018-01-18 ENCOUNTER — ALLSCRIPTS OFFICE VISIT (OUTPATIENT)
Dept: OTHER | Facility: OTHER | Age: 28
End: 2018-01-18

## 2018-01-18 LAB
GLUCOSE (HISTORICAL): NORMAL
PROT UR STRIP-MCNC: NORMAL MG/DL

## 2018-01-18 NOTE — RESULT NOTES
Verified Results  (Q) STEPWISE, PART 1 [id number] 01:59PM Paulina Breen     Test Name Result Flag Reference   INTERPRETATION SEE NOTE     This patient's risk does not exceed the first trimester  cut-off for Down syndrome or trisomy 18  The integrated  screen calculation is awaiting the second trimester sample  NT WAS USED IN THE RISK CALCULATIONS  Thank you for submitting this patient's Part 1 specimen  These first trimester values will be incorporated with the  second trimester values as part of the integrated testing  process  Please submit the Part 2 specimen between   10/03/2017-11/27/2017 (15 0 and 22 9 weeks gestation) with   10/03/2017-10/16/2017 (15 0 - 16 9 weeks gestation) being  optimal  When submitting Part 2, please include the  following Specimen # from Part 1:  [id number]   AGE RISK DOWN SYNDROME 1:760     KUSUM DOWN SYNDROME RISK <1:5000  <1:50   RISK FOR TRISOMY 18 <1:5000  <1:100   CALCULATED GESTATIONAL$AGE 13 4     Crown rump length (CRL) was used to calculate gestational  age  LIANA, if provided, was not used for gestational age  dating  CLARENCE-A 1173 5 ng/mL     This test was performed using a kit that has not been  cleared or approved by the FDA  The analytical performance  characteristics of this test have been determined by [facility]  This test  should not be used for diagnosis without confirmation by  other medically established means  CLARENCE-A MOM 1 04     HCG 28 4 IU/mL     HCG MOM 0 41     NT MOM 1 32     The maternal serum screening results indicate a lower risk  of trisomy 21 in this pregnancy  The nasal bone was assessed  via ultrasound and was present  The combined risk is  therefore likely to be less than the calculated risk  Other  findings later in the pregnancy may change the risk  Nasal bone assessment is best accomplished through a fetal  ultrasound performed between 11 weeks 0 days through 13  weeks 6 days   In assessing the risk for aneuploidy, the  evaluation of the maternal serum markers plus the nuchal  thickness measurement is calculated first  Any potential  change to the patient's risk for aneuploidy depends on the  nuchal thickness, crown-rump length, and the ethnic origin,  and therefore the values generated by the algorithm itself  will not change  Additional information about the assessment  of the fetal nasal bone may be found on the [facility] website at  [email or web address]  fetalmedicine com/fmf/training-certification/cert  hzyoqcck-sh-bypdk  tence/11-13-week-scan/assessment-of-the-nasal-bone/  Please note that the Sequential Integrated maternal serum  screen for Down syndrome risk assessment was designed by Dr Reinaldo Arteaga ([address], et al J Med Screen 2003 v10 p56-104)  to provide a high detection rate and low false positive rate  when a cutoff of 1:50 is used to identify high risk  pregnancies during the first trimester  Use of any other  cutoff for determination of risk in the first trimester will  result in a higher false positive rate for the two-part  screen  All patients whose risk is lower than 1:50 should  have a second sample submitted to complete the screen  This is a screening test, not a diagnostic test      This risk assessment is based on demographic data provided  by the ordering physician  Please notify the laboratory  promptly if any data are incorrect  If you have questions concerning this report: For clinical consultation, call [phone]; For technical questions, call [phone] ext [phone]; For recalculations, fax to [phone]  For additional information, please refer to  http://[employer]/faq/FAQ85  (This link is provided for informational/educational  purposes only )   REFERRING PHYSICIAN NAME [address] [place] [address] PHONE [phone]     REFERRING PHYSICIAN NPI [id number]     DATE OF BIRTH 1990     COLLECTION DATE 09/22/2017     ULTRASOUND DATE 09/20/2017     ULTRASONOGRAPHER'S NAME Asuncion Schilling ULTRASONOGRAPHER ID# [id number]     NTQR LOCATION ID# NOT GIVEN     NTQR READING PHYS ID# [id number]     [facility] ULTRASONOGRAPHER ID# NOT GIVEN     CROWN RUMP LENGTH 72 mm     NUCHAL TRANSLUCENCY 2 1 mm     IF TWINS NOT GIVEN     TWIN B CRL NOT GIVEN mm     TWIN B NT NOT GIVEN mm     MATERNAL WEIGHT 160 lbs     EST'D DATE OF DELIVERY 03/23/2018     LIANA DETERMINED BY ULTRASOUND     MOTHER'S ETHNIC ORIGIN      NUMBER OF FETUSES 1     INSULIN DEPEND DIABETIC NO     REPEAT SPECIMEN NO     HX OF NEURAL TUBE DEFECTS NO     PREV PREG DOWN SYND NO     DONOR EGG NO     DONOR EGG; EGG RETRIEVAL NOT GIVEN     Nasal Bone PRESENT     Twin B Nasal Bone NOT GIVEN

## 2018-01-22 VITALS
DIASTOLIC BLOOD PRESSURE: 82 MMHG | WEIGHT: 162.2 LBS | SYSTOLIC BLOOD PRESSURE: 131 MMHG | HEIGHT: 60 IN | HEART RATE: 98 BPM | BODY MASS INDEX: 31.84 KG/M2

## 2018-01-22 VITALS
BODY MASS INDEX: 32.59 KG/M2 | HEIGHT: 60 IN | DIASTOLIC BLOOD PRESSURE: 78 MMHG | SYSTOLIC BLOOD PRESSURE: 124 MMHG | WEIGHT: 166 LBS

## 2018-01-22 VITALS
DIASTOLIC BLOOD PRESSURE: 71 MMHG | HEART RATE: 91 BPM | SYSTOLIC BLOOD PRESSURE: 112 MMHG | HEIGHT: 60 IN | BODY MASS INDEX: 33.18 KG/M2 | WEIGHT: 169 LBS

## 2018-01-22 VITALS
HEART RATE: 101 BPM | SYSTOLIC BLOOD PRESSURE: 119 MMHG | DIASTOLIC BLOOD PRESSURE: 76 MMHG | WEIGHT: 175 LBS | HEIGHT: 60 IN | BODY MASS INDEX: 34.36 KG/M2

## 2018-01-22 VITALS
SYSTOLIC BLOOD PRESSURE: 126 MMHG | DIASTOLIC BLOOD PRESSURE: 66 MMHG | WEIGHT: 160.25 LBS | BODY MASS INDEX: 31.46 KG/M2 | HEIGHT: 60 IN

## 2018-01-22 VITALS
DIASTOLIC BLOOD PRESSURE: 71 MMHG | SYSTOLIC BLOOD PRESSURE: 113 MMHG | HEIGHT: 60 IN | WEIGHT: 156 LBS | BODY MASS INDEX: 30.63 KG/M2

## 2018-02-01 ENCOUNTER — ROUTINE PRENATAL (OUTPATIENT)
Dept: OBGYN CLINIC | Facility: CLINIC | Age: 28
End: 2018-02-01
Payer: COMMERCIAL

## 2018-02-01 VITALS
WEIGHT: 175 LBS | SYSTOLIC BLOOD PRESSURE: 106 MMHG | DIASTOLIC BLOOD PRESSURE: 72 MMHG | BODY MASS INDEX: 34.18 KG/M2 | HEART RATE: 89 BPM

## 2018-02-01 DIAGNOSIS — Z34.93 PRENATAL CARE IN THIRD TRIMESTER: Primary | ICD-10-CM

## 2018-02-01 PROBLEM — Z3A.32 32 WEEKS GESTATION OF PREGNANCY: Status: ACTIVE | Noted: 2018-02-01

## 2018-02-01 LAB
SL AMB  POCT GLUCOSE, UA: NORMAL
SL AMB POCT TOTAL PROTEIN: NORMAL

## 2018-02-01 PROCEDURE — 81002 URINALYSIS NONAUTO W/O SCOPE: CPT | Performed by: OBSTETRICS & GYNECOLOGY

## 2018-02-01 PROCEDURE — 99213 OFFICE O/P EST LOW 20 MIN: CPT | Performed by: OBSTETRICS & GYNECOLOGY

## 2018-02-01 NOTE — PROGRESS NOTES
Assessment  24 y o   at [address] [place] [address] presenting for routine prenatal visit  Plan  Diagnoses and all orders for this visit:    Prenatal care in third trimester  -     POCT urine dip  -     28w labs not yet completed, but has appointment for them on Saturday at [address] [place] [address]  labor precautions  -     Return to office in 2 weeks for prenatal visit        ____________________________________________________________        Subjective    Cleave Jayleen is a 24 y o  Ival Nett at [address] [place] [address] who presents for routine prenatal visit  She is without complaint  She feels more vaginal pressure than before, but not unexpected per pt  She denies contractions, loss of fluid, or vaginal bleeding  She feels regular fetal movements       Pregnancy Problems:  Patient Active Problem List   Diagnosis    32 weeks gestation of pregnancy         Objective     /72   Pulse 89   Wt 79 4 kg (175 lb)   LMP 2017 (Exact Date)   BMI 34 18 kg/m²   FHT: 148 BPM   Uterine Size: 32 cm

## 2018-02-05 LAB
ERYTHROCYTE [DISTWIDTH] IN BLOOD BY AUTOMATED COUNT: 12.3 % (ref 11–15)
GLUCOSE 1H P 50 G GLC PO SERPL-MCNC: 131 MG/DL
HCT VFR BLD AUTO: 33.9 % (ref 35–45)
HGB BLD-MCNC: 11.2 G/DL (ref 11.7–15.5)
MCH RBC QN AUTO: 27.8 PG (ref 27–33)
MCHC RBC AUTO-ENTMCNC: 33 G/DL (ref 32–36)
MCV RBC AUTO: 84.1 FL (ref 80–100)
PLATELET # BLD AUTO: 202 THOUSAND/UL (ref 140–400)
PMV BLD REES-ECKER: 11.1 FL (ref 7.5–12.5)
RBC # BLD AUTO: 4.03 MILLION/UL (ref 3.8–5.1)
RPR SER QL: NORMAL
WBC # BLD AUTO: 8.7 THOUSAND/UL (ref 3.8–10.8)

## 2018-02-08 ENCOUNTER — ULTRASOUND (OUTPATIENT)
Dept: PERINATAL CARE | Facility: CLINIC | Age: 28
End: 2018-02-08
Payer: COMMERCIAL

## 2018-02-08 VITALS
BODY MASS INDEX: 34.17 KG/M2 | WEIGHT: 181 LBS | HEIGHT: 61 IN | SYSTOLIC BLOOD PRESSURE: 115 MMHG | DIASTOLIC BLOOD PRESSURE: 77 MMHG | HEART RATE: 80 BPM

## 2018-02-08 DIAGNOSIS — O99.213 OBESITY AFFECTING PREGNANCY, ANTEPARTUM, THIRD TRIMESTER: Primary | ICD-10-CM

## 2018-02-08 PROCEDURE — 76816 OB US FOLLOW-UP PER FETUS: CPT | Performed by: OBSTETRICS & GYNECOLOGY

## 2018-02-08 PROCEDURE — 99212 OFFICE O/P EST SF 10 MIN: CPT | Performed by: OBSTETRICS & GYNECOLOGY

## 2018-02-08 NOTE — LETTER
February 8, 2018     [facility] [address]    Patient: Guzman Lozoya   YOB: 1990   Date of Visit: 2/8/2018       Dear Dr Sandra Alfaro: Thank you for referring Prakash Babcock to me for evaluation  Below are my notes for this consultation  If you have questions, please do not hesitate to call me  I look forward to following your patient along with you  Sincerely,        Rebeka Clark MD        [facility]: [address] [facility] [address]  Rebeka Clark MD  2/8/2018  6:27 PM  Sign at close encounter  Please refer to the ultrasound report for additional information regarding the MFM evaluation of Sandie Trotter in the [address] [place] [address]

## 2018-02-08 NOTE — PROGRESS NOTES
Please refer to the ultrasound report for additional information regarding the MFM evaluation of [facility] the [facility]  today

## 2018-02-15 ENCOUNTER — ROUTINE PRENATAL (OUTPATIENT)
Dept: OBGYN CLINIC | Facility: CLINIC | Age: 28
End: 2018-02-15
Payer: COMMERCIAL

## 2018-02-15 VITALS
HEART RATE: 89 BPM | DIASTOLIC BLOOD PRESSURE: 72 MMHG | WEIGHT: 179.2 LBS | SYSTOLIC BLOOD PRESSURE: 111 MMHG | BODY MASS INDEX: 33.86 KG/M2

## 2018-02-15 DIAGNOSIS — Z3A.32 32 WEEKS GESTATION OF PREGNANCY: ICD-10-CM

## 2018-02-15 DIAGNOSIS — Z3A.34 34 WEEKS GESTATION OF PREGNANCY: Primary | ICD-10-CM

## 2018-02-15 LAB
SL AMB  POCT GLUCOSE, UA: NEGATIVE
SL AMB POCT KETONES,UA: NEGATIVE

## 2018-02-15 PROCEDURE — 81002 URINALYSIS NONAUTO W/O SCOPE: CPT | Performed by: NURSE PRACTITIONER

## 2018-02-15 PROCEDURE — 99213 OFFICE O/P EST LOW 20 MIN: CPT | Performed by: NURSE PRACTITIONER

## 2018-02-15 NOTE — PATIENT INSTRUCTIONS
To call with vaginal bleeding, loss of fluid, regular contractions, decreased fetal movement, other needs or concerns    Return in 2 weeks

## 2018-02-15 NOTE — PROGRESS NOTES
Assessment & Plan  24 y o   at 34w6d presenting for routine prenatal visit  Problem List Items Addressed This Visit     RESOLVED: 32 weeks gestation of pregnancy    34 weeks gestation of pregnancy - Primary    Relevant Orders    POCT urine dip (Completed)        ____________________________________________________________    Subjective  Jennifer Ruiz is a 24 y o   at 34w6d who presents for routine prenatal visit  She is without complaint  She denies contractions, loss of fluid, or vaginal bleeding  She feels regular fetal movements  Objective  /72   Pulse 89   Wt 81 3 kg (179 lb 3 2 oz)   LMP 2017 (Exact Date)   BMI 33 86 kg/m²   FHR: 140    Patient's Active Problem List  Patient Active Problem List   Diagnosis    Obesity affecting pregnancy, antepartum, third trimester    34 weeks gestation of pregnancy     Discussed 2 lb wt loss, pt states she is eating healthier  GBS at next visit  To call with vaginal bleeding, loss of fluid, regular contractions, decreased fetal movement, other needs or concerns  Pt verbalized understanding of all discussed       Return in 2 weeks

## 2018-03-06 ENCOUNTER — ROUTINE PRENATAL (OUTPATIENT)
Dept: OBGYN CLINIC | Facility: CLINIC | Age: 28
End: 2018-03-06
Payer: COMMERCIAL

## 2018-03-06 VITALS
SYSTOLIC BLOOD PRESSURE: 124 MMHG | DIASTOLIC BLOOD PRESSURE: 78 MMHG | BODY MASS INDEX: 34.17 KG/M2 | HEIGHT: 61 IN | WEIGHT: 181 LBS | HEART RATE: 83 BPM

## 2018-03-06 DIAGNOSIS — Z34.93 PRENATAL CARE IN THIRD TRIMESTER: Primary | ICD-10-CM

## 2018-03-06 LAB — SL AMB  POCT GLUCOSE, UA: NORMAL

## 2018-03-06 PROCEDURE — 87491 CHLMYD TRACH DNA AMP PROBE: CPT | Performed by: OBSTETRICS & GYNECOLOGY

## 2018-03-06 PROCEDURE — 99213 OFFICE O/P EST LOW 20 MIN: CPT | Performed by: OBSTETRICS & GYNECOLOGY

## 2018-03-06 PROCEDURE — 81002 URINALYSIS NONAUTO W/O SCOPE: CPT | Performed by: OBSTETRICS & GYNECOLOGY

## 2018-03-06 PROCEDURE — 87591 N.GONORRHOEAE DNA AMP PROB: CPT | Performed by: OBSTETRICS & GYNECOLOGY

## 2018-03-06 PROCEDURE — 87653 STREP B DNA AMP PROBE: CPT | Performed by: OBSTETRICS & GYNECOLOGY

## 2018-03-06 NOTE — PROGRESS NOTES
23 y/o  @37w4d presents for routine prenatal visit  She reports occasional pelvic pressure  She denies contractions, leakage of fluid or vaginal bleeding  She reports good fetal movement  Prenatal labs reviewed and uptodate date  1hr   Blood type A+  S/p influenza and Tdap vaccinations  Growth ultrasound on 18 secondary to obesity, EFW 39%  GBS, GC/CT collected today   SVE 1/thick/high  Labor & kick count precautions reviewed  Return to care in 1 weeks, sooner for any concerns    Discussed w/ Dr Steve Calzada

## 2018-03-06 NOTE — PATIENT INSTRUCTIONS
Conteo de patadas en el embarazo   LO QUE NECESITA SABER:   El conteo de patadas mide cuánto se está moviendo carrillo bebé en el útero  Thomas patada de carrillo bebé podría sentirse luciana thomas torcedura, thomas vuelta, un crujido, un meneo o un golpe  Es común sentir a tu bebé patear a las 32 a 29 semanas de [place]  Es posible que sienta al bebé patear ya a las 20 semanas de [place]  INSTRUCCIONES SOBRE EL ISABELLA HOSPITALARIA:   Regrese a la renae de emergencias si:   · Usted siente que carrillo bebé patea menos conforme pasa el día  · Usted no siente ninguna patada en un día  Pregúntele a carrillo [place] vitaminas y minerales son adecuados para usted  · Usted siente un cambio en el número de patadas o movimientos de carrillo bebé  · Usted siente menos de 10 patadas dentro de 2 horas después de contar 2 veces  · Usted tiene preguntas o inquietudes acerca de los movimientos de carrillo bebé  Por qué realizar el conteo de patadas:  Los movimientos de carrillo bebé podrían proporcionar información de la joel de carrillo bebé  Es posible que si hay problemas, carrillo bebé se mueva menos o nada en lo absoluto  Él podría moverse menos si no tiene suficiente espacio para desarrollarse en carrillo útero (vientre), o si no está obteniendo suficiente oxígeno o nutrientes de la placenta  Informe a carrillo médico tan pronto luciana usted sienta un cambio en los movimientos de carrillo bebé  Los problemas que se encuentran en thomas etapa más temprana son más fáciles de tratar  ¿Cuándo tengo que realizar el conteo de patadas? Cuándo realizar el conteo de patadas:   · Cuente las patadas en el mismo horario todos los GRASSE  · Realice el conteo de las patadas cuando carrillo bebé esté despierto y [place]  Carrillo bebé podría estar más activo en la tarde  · Realice el conteo de las patadas después de comer o monique un refrigerio  Carrillo bebé podría estar más activo después de que usted coma  Espere 2 horas después de beber líquidos que contengan cafeína   La cafeína puede hacer que carrillo bebé esté más activo que lo normal     · Usted no debe fumar mientras está embarazada  El fumar aumenta el riesgo de problemas de joel para usted y para carrillo bebé garfield el [place]lene [place]isel  Si usted fuma, espere 2 horas para realizar el conteo de patadas  La nicotina puede hacer que carrillo bebé sea más activo de lo usual   Cómo realizar el conteo de patadas:  Revise que carrillo bebé esté despierto antes de realizar el conteo de patadas  Usted puede despertar a carrillo bebé empujando carrillo estómago suavemente, caminando o tomando algo frío  Carrillo médico podría indicarle diferentes maneras de realizar el conteo  Él podría decirle que yolis lo siguiente:  · Use thomas gráfica o un reloj para mantener un registro de la hora en que comienza y termina de contar  · Siéntese en thomas silla o acuéstese en carrillo costado derecho  · Coloque ale omid en la parte más delano de carrillo BJURHOLM  · Cuente hasta que llegue a las 10 patadas  Escriba cuánto tiempo le lleva contar las 10 patadas  · Podría monique de 30 minutos a 2 horas para contar 10 patadas  No debería de monique más de 2 horas para contar 10 patadas  · Si usted no siente las 10 patadas dentro de 2 horas, espere 1 hora y cuente de [place]  Carrillo bebé puede dormir hasta por 40 minutos a la vez  Acuda a ale consultas de control con carrillo médico según le indicaron  Anote ale preguntas para que se acuerde de hacerlas garfield ale visitas  © 2017 [address] Information is for End User's use only and may not be sold, redistributed or otherwise used for commercial purposes  All illustrations and images included in CareNotes® are the copyrighted property of A D A M , Inc  or Zhou Thompson  Esta información es sólo para uso en educación  Carrillo intención no es darle un consejo médico sobre enfermedades o tratamientos  Colsulte con carrillo [place] farmacéutico antes de seguir cualquier régimen médico para saber si es seguro y efectivo para usted    El embarazo de la semana 28 a la 38   LO QUE NECESITA SABER:   Al principio de las 37 semanas se considera que usted está en término completo  Podría ser mas fácil que usted respire si carrillo bebé se ha posicionado con la anthony hacia abajo  Es posible que usted necesite orinar con mayor frecuencia ya que el bebé podría estar presionando carrillo vejiga  Usted también podría sentir más molestias y cansarse fácilmente  INSTRUCCIONES SOBRE EL ISABELLA HOSPITALARIA:   Busque atención médica de inmediato si:   · Usted presenta un ketan dolor de anthony que no desaparece  · Usted tiene cambios en la visión nuevos o en aumento, luciana visión borrosa o con manchas  · Usted tiene inflamación nueva o creciente en carrillo teodora o omid  · Usted tiene manchado o sangrado vaginal     · Usted rompe basil o siente un chorro o gotas de agua tibia que le está bajando por carrillo vagina  Pregúntele a carrillo [place] vitaminas y minerales son adecuados para usted  · Usted tiene más de 5 contracciones en 1 hora  · Usted nota algún cambio en los movimientos de carrillo bebé  · Usted tiene calambres, presión o tensión abdominal     · Usted tiene un cambio en la secreción vaginal     · Usted tiene escalofríos o fiebre  · Usted tiene comezón, ardor o dolor vaginal      · Usted tiene thomas secreción vaginal amarillenta, verdosa, henry o de [employer]  · Usted tiene dolor o ardor al [place], orina menos de lo habitual o tiene [place] rosada o sanguinolenta  · Usted tiene preguntas o inquietudes acerca de carrillo condición o cuidado  Cómo cuidarse en esta etapa de carrillo embarazo:   · Consuma alimentos saludables y variados  Alimentos saludables incluyen frutas, verduras, panes de gabriele integral, alimentos lácteos bajos en grasa, frijoles, baltazar magras y pescado  [place] líquidos luciana se le haya indicado  Pregunte cuánto líquido debe monique cada día y cuáles líquidos son los más adecuados para usted  Limite el consumo de cafeína a menos de [address]   Limite el consumo de pescado a 2 porciones cada semana  Escoja pescado con concentraciones bajas de randal luciana atún al natural enlatado, camarón, salmón, bacalao o tilapia  No  coma pescado con concentraciones altas de randal luciana pez cody, caballa gigante, pargo rayado y tiburón  · [id number] [place]  Carrillo necesidad de ciertas vitaminas y [address], luciana el ácido fólico, aumenta garfield el [facility]  Las vitaminas prenatales proporcionan algunas de las vitaminas y minerales adicionales que usted necesita  Las vitaminas prenatales también podrían ayudar a disminuir el riesgo de ciertos defectos de nacimiento  · Descanse tanto luciana sea necesario  Levante ale pies si tiene inflamación en ale tobillos y pies  · No fume  Si usted fuma, nunca es demasiado tarde para dejar de hacerlo  Fumar aumenta el riesgo de aborto espontáneo y otros problemas de joel garfield carrillo [facility]  Fumar puede causar que carrillo bebé nazca antes de tiempo o que pese menos al nacer  Solicite información a carrillo médico si usted necesita ayuda para dejar de fumar  · No consuma alcohol  El alcohol pasa de carrillo cuerpo al bebé a través de la placenta  Puede afectar el desarrollo del cerebro de carrillo bebé y provocar el síndrome de alcoholismo fetal (SAF)  FAS es un sami de condiciones que causan [address], de comportamiento y de crecimiento  · Consulte con carrillo médico antes de monique cualquier medicamento  Muchos medicamentos pueden perjudicar a carrillo bebé si usted los chet [address]  No tome ningún medicamento, vitaminas, hierbas o suplementos sin ricky consultar con carrillo [place]  use drogas ilegales o de la hernandez (luciana marihuana o cocaína) mientras está embarazada  · Hable con carrillo médico antes de viajar  Es posible que no pueda viajar en avión después de las 36 [address]  También le puede recomendar que evite largos viajes por carretera  Consejos de seguridad:   · Evite jacuzzis y saunas    No use un jacuzzi o un sauna mientras usted está embarazada, especialmente garfield el primer trimestre  Los [facility] y los saunas aumentan la temperatura de hernandez bebé y el riesgo de defectos de nacimiento  · Evite la toxoplasmosis  [place] es thomas infección causada por comer carne cruda o estar cerca del excremento de un kishor infectado  [place] puede causar malformaciones congénitas, aborto espontáneo y [place]in  Lávese las omid después de tocar carne cruda  Asegúrese de que la carne esté ash cocida antes de comerla  Evite los huevos crudos y la [place]  Use guantes o pida que alguien la ayude a limpiar la caja de arena del kishor mientras usted [place]  · Consulte con hernandez médico acerca de viajar  El [address] cómodo para viajar es garfield el elia trimestre  Pregunte a hernandez médico si usted puede viajar después de las 36 semanas  Es posible que no pueda viajar en avión después de las 36 [facility]  También le puede recomendar que evite largos viajes por carretera  Cambios que están ocurriendo con hernandez bebé:  Para las 38 semanas, hernandez bebé podría pesar entre 6 y 5 libras  Hernandez bebé podría medir alrededor de 14 pulgadas de carlos desde la punta de la anthony hasta la rabadilla (parte inferior del bebé)  Hernandez bebé escucha lo suficientemente ash luciana para reconocer hernandez voz  Conforme hernandez bebé crece más, usted podría sentir menos patadas y sentir más que hernandez bebé se estira y [place]  Hernandez bebé podría moverse en posición con la anthony hacia abajo  Hernandez bebé también descansará en la parte inferior de hernandez abdomen  Lo que necesita saber acerca del cuidado prenatal:  Hernandez médico le revisará hernandez presión arterial y [place]  Es posible que también necesite lo siguiente:  · Un examen de orina  también podría realizarse para revisarle el azúcar y la proteína  Estas son señales de diabetes gestacional o de infección  La proteína en hernandez orina también podría ser thomas señal de preeclampsia   La preeclampsia es [employer] puede desarrollarse garfield la semana 20 o después en hernandez embarazo  Esta provoca presión arterial demetria y [employer] con ale riñones y [place]  · Los análisis de estrella  se pueden realizar para revisar si tiene signos de anemia (nivel bajo del loco)  · La vacuna Tdap  podría ser recomendado por hernandez médico      · El examen del estreptococo del sami B  es un examen que se realiza para verificar si hay infección por estreptococos del sami B  El estreptococo del sami B es un tipo de bacteria que se puede encontrar en la vagina o el recto  Podría ser transmitida a hernandez bebé garfield el parto si usted la tiene  Hernandez médico podría monique [place] de hernandez vagina o recto y mikhail la Raymona Gold al laboratorio para que la examinen  · La altura uterina  es thomas medición del útero para controlar el desarrollo de hernandez bebé  [employer] por lo general es igual al número de [address] que usted tiene de embarazo  Hernandez médico también podría revisar la posición de hernandez bebé  · El ritmo cardíaco de hernandez bebé  será revisado  © 2017 [address] Information is for End User's use only and may not be sold, redistributed or otherwise used for commercial purposes  All illustrations and images included in CareNotes® are the copyrighted property of A D A M , Inc  or Zhou Thompson  Esta información es sólo para uso en educación  Hernandez intención no es darle un consejo médico sobre enfermedades o tratamientos  Colsulte con hernandez [employer] farmacéutico antes de seguir cualquier régimen médico para saber si es seguro y efectivo para usted

## 2018-03-07 NOTE — PROGRESS NOTES
Education  Baby & Me Education 1st Trimester:   First Trimester Education provided:   benefits of breastfeeding, importance of exclusive breastfeeding, early initiation of breastfeeding, exclusive breastfeeding for the first 6 months and Pregnancy Essentials Reference Guide given   The patient is planning on breastfeeding        Signatures   Electronically signed by : Wesly Méndez RN; Aug 21 2017  4:28PM EST                       (Author)

## 2018-03-07 NOTE — PROGRESS NOTES
Education  Baby & Me Education 3rd Trimester: Third Trimester Education provided:   benefits of breastfeeding, importance of exclusive breastfeeding, early initiation of breastfeeding, frequent feedings for optimal milk production, feeding on demand/baby-led feedings, effective positioning and attachment and importance of early skin-to-skin contact  rooming-in on 24 hour basis   The patient is planning on breastfeeding  The patient is not planning on exclusively breastfeeding until the baby is 10months of age  Mother has not registered for prenatal class  Thought has been given to selecting a pediatrician  The mother has not discussed personal preferences with her provider     Prenatal education provided by: Lowell Houston 1/18/2018      Signatures   Electronically signed by : Denise Dailey, ; Jan 18 2018 11:08AM EST                       (Author)

## 2018-03-10 LAB
CHLAMYDIA DNA CVX QL NAA+PROBE: NORMAL
GP B STREP DNA SPEC QL NAA+PROBE: ABNORMAL
N GONORRHOEA DNA GENITAL QL NAA+PROBE: NORMAL

## 2018-03-11 PROBLEM — B95.1 POSITIVE GBS TEST: Status: ACTIVE | Noted: 2018-03-11

## 2018-03-13 ENCOUNTER — ROUTINE PRENATAL (OUTPATIENT)
Dept: OBGYN CLINIC | Facility: CLINIC | Age: 28
End: 2018-03-13
Payer: COMMERCIAL

## 2018-03-13 VITALS
DIASTOLIC BLOOD PRESSURE: 80 MMHG | HEART RATE: 87 BPM | SYSTOLIC BLOOD PRESSURE: 124 MMHG | WEIGHT: 181 LBS | HEIGHT: 61 IN | BODY MASS INDEX: 34.17 KG/M2

## 2018-03-13 DIAGNOSIS — Z34.93 PRENATAL CARE IN THIRD TRIMESTER: Primary | ICD-10-CM

## 2018-03-13 DIAGNOSIS — Z3A.38 38 WEEKS GESTATION OF PREGNANCY: ICD-10-CM

## 2018-03-13 DIAGNOSIS — B95.1 POSITIVE GBS TEST: ICD-10-CM

## 2018-03-13 PROBLEM — Z3A.34 34 WEEKS GESTATION OF PREGNANCY: Status: RESOLVED | Noted: 2018-02-15 | Resolved: 2018-03-13

## 2018-03-13 LAB
SL AMB  POCT GLUCOSE, UA: NORMAL
SL AMB POCT ALBUMIN: NORMAL

## 2018-03-13 PROCEDURE — 81002 URINALYSIS NONAUTO W/O SCOPE: CPT | Performed by: OBSTETRICS & GYNECOLOGY

## 2018-03-13 PROCEDURE — 99212 OFFICE O/P EST SF 10 MIN: CPT | Performed by: OBSTETRICS & GYNECOLOGY

## 2018-03-13 NOTE — PROGRESS NOTES
Pt is a 24yo [id number] @  4wga here for routine PN visit  Denies any OB complaints  Feels baby moving  Occasional [place] contractions  'sbpm  SVE 2-3/50/high, posterior, soft     GBS positive - no PCN allergies  Plans to bottlefeed  Counseled on breastfeeding benefits  Postpartum contraception: discussed options  Patient in past had Mirena, no longer wants IUD  COCP's ineffective  Provided pamphlet  Labor precautions reviewed  PO hydration  Tylenol PRN  RTC in 1 week for routine PN visit

## 2018-03-20 ENCOUNTER — ROUTINE PRENATAL (OUTPATIENT)
Dept: OBGYN CLINIC | Facility: CLINIC | Age: 28
End: 2018-03-20
Payer: COMMERCIAL

## 2018-03-20 VITALS
WEIGHT: 183 LBS | BODY MASS INDEX: 34.58 KG/M2 | SYSTOLIC BLOOD PRESSURE: 122 MMHG | DIASTOLIC BLOOD PRESSURE: 76 MMHG | HEART RATE: 82 BPM

## 2018-03-20 DIAGNOSIS — Z3A.39 39 WEEKS GESTATION OF PREGNANCY: ICD-10-CM

## 2018-03-20 DIAGNOSIS — Z34.93 PRENATAL CARE IN THIRD TRIMESTER: Primary | ICD-10-CM

## 2018-03-20 DIAGNOSIS — B95.1 POSITIVE GBS TEST: ICD-10-CM

## 2018-03-20 LAB
SL AMB  POCT GLUCOSE, UA: NORMAL
SL AMB POCT URINE PROTEIN: NORMAL

## 2018-03-20 PROCEDURE — 81002 URINALYSIS NONAUTO W/O SCOPE: CPT | Performed by: NURSE PRACTITIONER

## 2018-03-20 PROCEDURE — 99213 OFFICE O/P EST LOW 20 MIN: CPT | Performed by: NURSE PRACTITIONER

## 2018-03-20 NOTE — PROGRESS NOTES
Assessment & Plan  24 y o   at 39w4d presenting for routine prenatal visit  Feeling well, occ  Irregular ctx     Problem List Items Addressed This Visit     Positive GBS test    39 weeks gestation of pregnancy      Other Visit Diagnoses     Prenatal care in third trimester    -  Primary    Relevant Orders    POCT urine dip (Completed)        ____________________________________________________________  Subjective  She is without complaint  She denies contractions, loss of fluid, or vaginal bleeding  She feels regular fetal movements  Objective  /76   Pulse 82   Wt 83 kg (183 lb)   LMP 2017 (Exact Date)   BMI 34 58 kg/m²   FHR: 150    Patient's Active Problem List  Patient Active Problem List   Diagnosis    Obesity affecting pregnancy, antepartum, third trimester    Positive GBS test    39 weeks gestation of pregnancy     Plan  Call with vaginal bleeding, loss of fluid, regular contractions, decreased fetal movement, other needs or concerns  Return in 1 week  Pt verbalized understanding of all discussed

## 2018-03-20 NOTE — PATIENT INSTRUCTIONS
Call with vaginal bleeding, loss of fluid, regular contractions, decreased fetal movement, other needs or concerns    Return in 1 week

## 2018-03-23 ENCOUNTER — HOSPITAL ENCOUNTER (INPATIENT)
Facility: HOSPITAL | Age: 28
LOS: 2 days | Discharge: HOME | DRG: 560 | End: 2018-03-25
Attending: OBSTETRICS & GYNECOLOGY | Admitting: OBSTETRICS & GYNECOLOGY
Payer: COMMERCIAL

## 2018-03-23 DIAGNOSIS — Z3A.40 40 WEEKS GESTATION OF PREGNANCY: ICD-10-CM

## 2018-03-23 LAB
ABO GROUP BLD: NORMAL
BASE EXCESS BLDCOA CALC-SCNC: -4.6 MMOL/L (ref 3–11)
BASE EXCESS BLDCOV CALC-SCNC: -3.6 MMOL/L (ref 1–9)
BLD GP AB SCN SERPL QL: NEGATIVE
ERYTHROCYTE [DISTWIDTH] IN BLOOD BY AUTOMATED COUNT: 12.9 % (ref 11.6–15.1)
HCO3 BLDCOA-SCNC: 23.6 MMOL/L (ref 17.3–27.3)
HCO3 BLDCOV-SCNC: 21.8 MMOL/L (ref 12.2–28.6)
HCT VFR BLD AUTO: 35.4 % (ref 34.8–46.1)
HGB BLD-MCNC: 11.8 G/DL (ref 11.5–15.4)
MCH RBC QN AUTO: 27.9 PG (ref 26.8–34.3)
MCHC RBC AUTO-ENTMCNC: 33.3 G/DL (ref 31.4–37.4)
MCV RBC AUTO: 84 FL (ref 82–98)
O2 CT VFR BLDCOA CALC: 6.8 ML/DL
OXYHGB MFR BLDCOA: 33.9 %
OXYHGB MFR BLDCOV: 63 %
PCO2 BLDCOA: 56.9 MM[HG] (ref 30–60)
PCO2 BLDCOV: 40.7 MM HG (ref 27–43)
PH BLDCOA: 7.24 [PH] (ref 7.23–7.43)
PH BLDCOV: 7.35 [PH] (ref 7.19–7.49)
PLATELET # BLD AUTO: 219 THOUSANDS/UL (ref 149–390)
PMV BLD AUTO: 11.5 FL (ref 8.9–12.7)
PO2 BLDCOA: 18.8 MM HG (ref 5–25)
PO2 BLDCOV: 27.3 MM HG (ref 15–45)
RBC # BLD AUTO: 4.23 MILLION/UL (ref 3.81–5.12)
RH BLD: POSITIVE
RPR SER QL: NORMAL
SPECIMEN EXPIRATION DATE: NORMAL
WBC # BLD AUTO: 10.51 THOUSAND/UL (ref 4.31–10.16)

## 2018-03-23 PROCEDURE — 85027 COMPLETE CBC AUTOMATED: CPT | Performed by: OBSTETRICS & GYNECOLOGY

## 2018-03-23 PROCEDURE — 82805 BLOOD GASES W/O2 SATURATION: CPT | Performed by: OBSTETRICS & GYNECOLOGY

## 2018-03-23 PROCEDURE — 86900 BLOOD TYPING SEROLOGIC ABO: CPT | Performed by: OBSTETRICS & GYNECOLOGY

## 2018-03-23 PROCEDURE — 86592 SYPHILIS TEST NON-TREP QUAL: CPT | Performed by: OBSTETRICS & GYNECOLOGY

## 2018-03-23 PROCEDURE — 86901 BLOOD TYPING SEROLOGIC RH(D): CPT | Performed by: OBSTETRICS & GYNECOLOGY

## 2018-03-23 PROCEDURE — 99212 OFFICE O/P EST SF 10 MIN: CPT

## 2018-03-23 PROCEDURE — 59409 OBSTETRICAL CARE: CPT | Performed by: OBSTETRICS & GYNECOLOGY

## 2018-03-23 PROCEDURE — 86850 RBC ANTIBODY SCREEN: CPT | Performed by: OBSTETRICS & GYNECOLOGY

## 2018-03-23 RX ORDER — ONDANSETRON 2 MG/ML
4 INJECTION INTRAMUSCULAR; INTRAVENOUS EVERY 6 HOURS PRN
Status: DISCONTINUED | OUTPATIENT
Start: 2018-03-23 | End: 2018-03-23

## 2018-03-23 RX ORDER — OXYCODONE HYDROCHLORIDE AND ACETAMINOPHEN 5; 325 MG/1; MG/1
1 TABLET ORAL EVERY 4 HOURS PRN
Status: DISCONTINUED | OUTPATIENT
Start: 2018-03-23 | End: 2018-03-25 | Stop reason: HOSPADM

## 2018-03-23 RX ORDER — SIMETHICONE 80 MG
80 TABLET,CHEWABLE ORAL 4 TIMES DAILY PRN
Status: DISCONTINUED | OUTPATIENT
Start: 2018-03-23 | End: 2018-03-25 | Stop reason: HOSPADM

## 2018-03-23 RX ORDER — ONDANSETRON 2 MG/ML
4 INJECTION INTRAMUSCULAR; INTRAVENOUS EVERY 8 HOURS PRN
Status: DISCONTINUED | OUTPATIENT
Start: 2018-03-23 | End: 2018-03-25 | Stop reason: HOSPADM

## 2018-03-23 RX ORDER — DIAPER,BRIEF,INFANT-TODD,DISP
1 EACH MISCELLANEOUS AS NEEDED
Status: DISCONTINUED | OUTPATIENT
Start: 2018-03-23 | End: 2018-03-25 | Stop reason: HOSPADM

## 2018-03-23 RX ORDER — CALCIUM CARBONATE 200(500)MG
1000 TABLET,CHEWABLE ORAL DAILY PRN
Status: DISCONTINUED | OUTPATIENT
Start: 2018-03-23 | End: 2018-03-25 | Stop reason: HOSPADM

## 2018-03-23 RX ORDER — SODIUM CHLORIDE, SODIUM LACTATE, POTASSIUM CHLORIDE, CALCIUM CHLORIDE 600; 310; 30; 20 MG/100ML; MG/100ML; MG/100ML; MG/100ML
125 INJECTION, SOLUTION INTRAVENOUS CONTINUOUS
Status: DISCONTINUED | OUTPATIENT
Start: 2018-03-23 | End: 2018-03-23

## 2018-03-23 RX ORDER — IBUPROFEN 600 MG/1
600 TABLET ORAL EVERY 6 HOURS PRN
Status: DISCONTINUED | OUTPATIENT
Start: 2018-03-23 | End: 2018-03-25 | Stop reason: HOSPADM

## 2018-03-23 RX ORDER — OXYTOCIN/RINGER'S LACTATE 30/500 ML
30 PLASTIC BAG, INJECTION (ML) INTRAVENOUS ONCE
Status: COMPLETED | OUTPATIENT
Start: 2018-03-23 | End: 2018-03-23

## 2018-03-23 RX ORDER — DIPHENHYDRAMINE HCL 25 MG
25 TABLET ORAL EVERY 6 HOURS PRN
Status: DISCONTINUED | OUTPATIENT
Start: 2018-03-23 | End: 2018-03-25 | Stop reason: HOSPADM

## 2018-03-23 RX ORDER — DOCUSATE SODIUM 100 MG/1
100 CAPSULE, LIQUID FILLED ORAL 2 TIMES DAILY
Status: DISCONTINUED | OUTPATIENT
Start: 2018-03-23 | End: 2018-03-25 | Stop reason: HOSPADM

## 2018-03-23 RX ORDER — OXYCODONE HYDROCHLORIDE AND ACETAMINOPHEN 5; 325 MG/1; MG/1
2 TABLET ORAL EVERY 4 HOURS PRN
Status: DISCONTINUED | OUTPATIENT
Start: 2018-03-23 | End: 2018-03-25 | Stop reason: HOSPADM

## 2018-03-23 RX ORDER — ACETAMINOPHEN 325 MG/1
650 TABLET ORAL EVERY 6 HOURS PRN
Status: DISCONTINUED | OUTPATIENT
Start: 2018-03-23 | End: 2018-03-25 | Stop reason: HOSPADM

## 2018-03-23 RX ORDER — OXYTOCIN/RINGER'S LACTATE 30/500 ML
PLASTIC BAG, INJECTION (ML) INTRAVENOUS
Status: COMPLETED
Start: 2018-03-23 | End: 2018-03-23

## 2018-03-23 RX ADMIN — SODIUM CHLORIDE, SODIUM LACTATE, POTASSIUM CHLORIDE, AND CALCIUM CHLORIDE 125 ML/HR: .6; .31; .03; .02 INJECTION, SOLUTION INTRAVENOUS at 04:09

## 2018-03-23 RX ADMIN — Medication 30 UNITS: at 07:00

## 2018-03-23 RX ADMIN — IBUPROFEN 600 MG: 600 TABLET ORAL at 08:19

## 2018-03-23 RX ADMIN — DOCUSATE SODIUM 100 MG: 100 CAPSULE, LIQUID FILLED ORAL at 08:19

## 2018-03-23 RX ADMIN — SODIUM CHLORIDE 5 MILLION UNITS: 0.9 INJECTION, SOLUTION INTRAVENOUS at 04:09

## 2018-03-23 RX ADMIN — DOCUSATE SODIUM 100 MG: 100 CAPSULE, LIQUID FILLED ORAL at 17:55

## 2018-03-23 RX ADMIN — IBUPROFEN 600 MG: 600 TABLET ORAL at 16:59

## 2018-03-23 RX ADMIN — IBUPROFEN 600 MG: 600 TABLET ORAL at 23:04

## 2018-03-23 NOTE — DISCHARGE SUMMARY
OBSTETRICS - DISCHARGE SUMMARY  Odilia Smith 24 y o  female MRN: [id number]  Unit/Bed#: L&D 325-01 Encounter: [id number]    Admission Date: 3/23/2018   Discharge Date: 3/25/2018     Admission Diagnosis:    1) Term pregnancy  Discharge Diagnosis:   1) Vaginal delivery   2) Postpartum state    Service: Ob/Gyn, Attending: Dr Carole Palm  Discharge Attending: Helena Li MD      Procedures: spontaneous vaginal delivery     History & Physical: Please refer to H&P note    Hospital Course - Problem based:   1) Delivery: Routine antepartum management of pregnancy   2) Postpartum: Routine post-delivery care  Ambulating, passing flatus, tolerating oral intake, and pain controlled  Complications: none apparent   Condition at discharge: good     Disposition: See After Visit Summary for discharge disposition information  Discharge Medications: For a complete list, please refer to her med rec  Prenatal vitamin daily for 6 months or the duration of nursing whichever is longer  Motrin 600 mg orally every 6 hours as needed for pain  Tylenol (over the counter) per bottle directions as needed for pain  Hydrocortisone cream 1% (over the counter) applied 1-2x daily to hemorrhoids as needed  Witch hazel pads for hemorrhoidal discomfort as needed    Discharge instructions: See after visit summary for complete information  Do not place anything (no partner, tampons or douche) in your vagina for 6 weeks  You may walk for exercise for the first 6 weeks then gradually return to your usual activities     Please do not drive for 1 week if you have no stitches and for 2 weeks if you have stitches or underwent a  delivery     You may take baths or shower per your preference     Please look at your bust (breasts) in the mirror daily and call for redness or tenderness or increased warmth     If you have had a  please look at your incision daily as well and call us for increasing redness or steady drainage from the incision     Please call us for temperature > 100 4*F or 38* C, worsening pain or a foul discharge  Follow-Up Care:  See after visit summary for information related to follow-up care and any pertinent home health orders  Planned Readmission: No    Evangelina Ocampo MD  OBGYN, PGY3  3/25/2018 8:52 AM

## 2018-03-23 NOTE — L&D DELIVERY NOTE
DELIVERY NOTE  Dell Jimenez 24 y o  female MRN: [id number]  Unit/Bed#: L&D 325-01 Encounter: [id number]    Obstetrician:   Dr Luz Fletcher    Assistant: Dr Roxanna Richardson, Dr Jess Garza    Pre-Delivery Diagnosis: Term pregnancy or Spontaneous labor    Post-Delivery Diagnosis: Same as above - Delivered    Procedure: Spontaneous vaginal delivery    Indications for instrumental delivery: none    Estimated Blood Loss:  740            Complications:  none    Specimens: cord blood, cord, gasses    Description of Delivery:   With maternal expulsive efforts, the patient delivered a viable male   The position at delivery was RUTH ANN  The fetal vertex delivered spontaneously  There was no nuchal cord  The anterior shoulder delivered atraumatically with maternal expulsive forces and the assistance of gentle downward traction  The posterior shoulder delivered with maternal expulsive forces and the assistance of gentle upward traction  The remainder of the fetus delivered spontaneously  Upon delivery, the  was placed on the mother's abdomen and the umbilical cord was clamped and cut  The  resuscitator evaluated the  at bedside  Arterial and venous cord blood gases and cord blood were collected for analysis  These were sent to the lab  Active management of the third stage of labor included uterine massage and administration of IV Pitocin at 250 vazquez-units per minute  The uterus was noted to contract down well  The placenta delivered spontaneously and was noted to have a centrally-inserted 3-vessel cord  It was sent for lab  The vagina, cervix, perineum, and rectum were inspected and there was noted to be no lacerations  At the conclusion of the delivery, all needle, sponge, and instrument counts were noted to be correct  The patient tolerated the procedure well and was allowed to recover in labor and delivery room  Dr Usha Cadet MD was present      Blood Gasses  Results for Sabi Hernandez (MRN [id number]) as of 3/23/2018 07:18   Ref  Range 3/23/2018 06:59   pH, Cord Art Latest Ref Range: 7 230 - 7 430  7 236   pCO2, Cord Art Latest Ref Range: 30 0 - 60 0  56 9   pO2, Cord Art Latest Ref Range: 5 0 - 25 0 mm HG 18 8   HCO3, Cord Art Latest Ref Range: 17 3 - 27 3 mmol/L 23 6   Base Exc, Cord Art Latest Ref Range: 3 0 - 11 0 mmol/L -4 6 (L)   PH CORD VENOUS Latest Ref Range: 7 190 - 7 490  7  346   PCO2 CORD VENOUS Latest Ref Range: 27 0 - 43 0 mm HG 40 7   PO2 CORD VENOUS Latest Ref Range: 15 0 - 45 0 mm HG 27 3   HCO3 CORD VENOUS Latest Ref Range: 12 2 - 28 6 mmol/L 21 8   BASE EXCESS CORD VENOUS Latest Ref Range: 1 0 - 9 0 mmol/L -3 6 (L)   O2 Hgb, Arterial Cord Latest Units: % 33 9   O2 HGB,VENOUS CORD Latest Units: % 63 0

## 2018-03-23 NOTE — LACTATION NOTE
This note was copied from a baby's chart  CONSULT - LACTATION  Baby Boy  Devaughn Cm) Peterson Bue 0 days male MRN: [id number]    [address] [facility] NURSERY Room / Bed: L&D 325(N)/L&D 325(N) Encounter: [id number]    Maternal Information     MOTHER:  Tanner Phillips  Maternal Age: 24 y o    OB History: #: 1, Date: , Sex: None, Weight: None, GA: 4w0d, Delivery: None, Apgar1: None, Apgar5: None, Living: None, Birth Comments: None    #: 2, Date: 11, Sex: Male, Weight: 3317 g (7 lb 5 oz), GA: 38w0d, Delivery: Vaginal, Spontaneous Delivery, Apgar1: None, Apgar5: None, Living: Living, Birth Comments: None    #: 3, Date: 18, Sex: Male, Weight: 3317 g (7 lb 5 oz), GA: 40w0d, Delivery: Vaginal, Spontaneous Delivery, Apgar1: 9, Apgar5: 9, Living: Living, Birth Comments: None   Previouse breast reduction surgery?  No    Lactation history:   Has patient previously breast fed: No   How long had patient previously breast fed:     Previous breast feeding complications:       Past Surgical History:   Procedure Laterality Date    NO PAST SURGERIES      OTHER SURGICAL HISTORY      IUD removal, last assessed 12/8/15       Birth information:  YOB: 2016   Time of birth: 6:53 AM   Sex: male   Delivery type: Vaginal, Spontaneous Delivery   Birth Weight: 3317 g (7 lb 5 oz)   Percent of Weight Change: 0%     Gestational Age: 37w0d   [unfilled]    Assessment     Breast and nipple assessment: did not assess at this time    [place] Assessment: did not assess at this time    Feeding assessment: feeding well  LATCH:  Latch: Grasps breast, tongue down, lips flanged, rhythmic sucking   Audible Swallowing: A few with stimulation   Type of Nipple: Everted (After stimulation)   Comfort (Breast/Nipple): Soft/non-tender   Hold (Positioning): Full assist, teach one side, mother does other, staff holds   LATCH Score: 8          Feeding recommendations:  breast feed on demand     Breastfeeding booklet given and reviewed with mother  Mother verbalized breastfeeding is going well  She exclusively pumped with her first child  Enc to call for assistance as needed,phone # given      Leola Escobedo RN 3/23/2018 12:01 PM

## 2018-03-23 NOTE — LETTER
[address] [facility] LABOR AND DELIVERY  [address]  Dept: [phone]    March 25, 2018     Patient: Maryuri Dobson   YOB: 1990   Date of Visit: 3/23/2018       To Whom it May Concern: This is a work note for [employer] who was present in hospital 3/23/18-3/25/18  If you have any questions or concerns, please don't hesitate to call [facility] at [phone]           Sincerely,    Francine Knight

## 2018-03-23 NOTE — H&P
History & Physical - OB/GYN   Dajuan Saldivar 24 y o  female MRN: [id number]  Unit/Bed#: L&D 325-01 Encounter: [id number]    24 y o   at 40w0d by first trimester ultrasound  She is a patient of [facility]    Chief complaint:  Spontaneous rupture of membranes, noting leakage of fluid around 0130 this morning  Now reporting contractions every 2 minutes    HPI:  Contractions:  yes  Fetal movement:  yes  Vaginal bleeding:   no  Leaking of fluid:  yes    Pregnancy Complications:  Patient Active Problem List   Diagnosis    Obesity affecting pregnancy, antepartum, third trimester    Positive GBS test    39 weeks gestation of pregnancy       PMH:  Past Medical History:   Diagnosis Date    IUD migration ([facility] [address] )     misplaced IUD, last assessed 13       PSH:  Past Surgical History:   Procedure Laterality Date    NO PAST SURGERIES      OTHER SURGICAL HISTORY      IUD removal, last assessed 12/8/15       Social Hx:  Denies x 3     OB Hx:  Obstetric History       T1      L1     SAB0   TAB1   Ectopic0   Multiple0   Live Births1       # Outcome Date GA Lbr John/2nd Weight Sex Delivery Anes PTL Lv   3 Current            2 Term 11 38w0d  3317 g (7 lb 5 oz) M Vag-Spont None N TONY      Complications: Nuchal cord   1 TAB  4w0d             Obstetric Comments   History of   , 2011 boy 7lbs       Meds:  No current facility-administered medications on file prior to encounter        Current Outpatient Prescriptions on File Prior to Encounter   Medication Sig Dispense Refill    docusate sodium (COLACE) 100 mg capsule Take 100 mg by mouth 2 (two) times a day      Prenatal Vit-Fe Fumarate-FA (PRENATAL MULTIVITAMIN) 28-0 8 MG TABS Take 1 tablet by mouth daily         Allergies:  No Known Allergies    Labs:  Blood type: O+  Antibody: negative  Group B strep: positive  HIV: negative  Hepatitis B: negative  RPR: NR  Rubella: Immune  Varicella Unknown  1 hour Glucose: 131    Physical Exam:  BP 133/78 (Patient Position: Sitting)   Pulse 78   Temp 98 2 °F (36 8 °C) (Oral)   Resp 18   Wt 84 4 kg (186 lb)   LMP 2017 (Exact Date)   SpO2 100%   BMI 35 14 kg/m²     Physical Exam   Constitutional: She is oriented to person, place, and time  She appears well-developed and well-nourished  No distress  Cardiovascular: Normal rate, regular rhythm and normal heart sounds  Pulmonary/Chest: Effort normal and breath sounds normal    Abdominal: Soft  Bowel sounds are normal  She exhibits distension  Gravid   Neurological: She is alert and oriented to person, place, and time  Estimated Fetal Weight: 7 5 lbs  Presentation: vertex    SVE: 6 / 80% / -1  FHT:  120 / Moderate 6 - 25 bpm / + accelerations, no decelerations  [place]: q2-3 minutes    Membranes: grossly ruptured    Assessment:   24 y o   at 40w0d in labor    Plan:   1  Admit to L&D  2  CBC, RPR, type and screen  3  GBS + status: PCN per protocol  4   FEN: LR IVF, clear liquid diet    Epidural upon request    Discussed with Dr Paul Angelo, DO

## 2018-03-24 PROCEDURE — 99024 POSTOP FOLLOW-UP VISIT: CPT | Performed by: OBSTETRICS & GYNECOLOGY

## 2018-03-24 RX ADMIN — DOCUSATE SODIUM 100 MG: 100 CAPSULE, LIQUID FILLED ORAL at 18:14

## 2018-03-24 RX ADMIN — Medication 1000 MG: at 11:41

## 2018-03-24 RX ADMIN — IBUPROFEN 600 MG: 600 TABLET ORAL at 08:20

## 2018-03-24 RX ADMIN — DOCUSATE SODIUM 100 MG: 100 CAPSULE, LIQUID FILLED ORAL at 08:22

## 2018-03-24 RX ADMIN — IBUPROFEN 600 MG: 600 TABLET ORAL at 21:47

## 2018-03-24 NOTE — PROGRESS NOTES
Postpartum Progress Note - OB/GYN   Cornelio Melara 24 y o  female MRN: [id number]  Unit/Bed#: L&D 314-01 Encounter: [id number]  Assessment:  24 y o  [id number] s/p  Postpartum day  1   Currently in stable condition    Plan:  Continue Routine Postpartum Care, Encourage Ambulation, Advance diet as tolerated  Encourage breastfeeding and ambulation  Anticipate DC home today or tomorrow  ~~~~~~~~~~~~~~~~~~~~~~~~~~~~~~~~~~~~~~~~~~~~~~~~~~~~~  Postpartum Day: 1    Procedure: Spontaneous Vaginal Delivery    Subjective/Objective   Chief Complaint: Postpartum    Subjective ROS  Pain: no  Tolerating Oral Intake: yes   Voiding: yes  Flatus: yes  Bowel Movement: no  Ambulating: yes  Breastfeeding: yes  Chest Pain: no  Shortness of Breath: no  Leg Pain/Discomfort: no  Lochia: minimal    Vitals: Temp:  [97 8 °F (36 6 °C)-98 7 °F (37 1 °C)] 98 3 °F (36 8 °C)  HR:  [51-70] 62  Resp:  [17-18] 17  BP: (101-133)/(57-73) 117/59      Intake/Output Summary (Last 24 hours) at 18 0810  Last data filed at 18 1422   Gross per 24 hour   Intake                0 ml   Output              500 ml   Net             -500 ml       Physical Exam  General: Cornelio Melara appears well, NAD, alert and oriented x 3, pleasant and cooperative   Cardiovascular: Regular, Rate and Rhythm, no murmur, gallop or rub   Lungs: Clear to Auscultation Bilaterally, no wheezing, rhonchi or rales   Abdomen: Soft, non-distended, non-tender, no rebound, guarding or CVA tenderness, bowel sounds+,    Fundus: Firm & Non-Tender -2 cm below the umbilicus  Incision: not applicable  Lower Extremities: Non-Tender, no edema    Labs:   Recent Results (from the past 72 hour(s))   Type and screen    Collection Time: 18  3:33 AM   Result Value Ref Range    ABO Grouping O     Rh Factor Positive     Antibody Screen Negative     Specimen Expiration Date 2018    CBC    Collection Time: 18  3:33 AM   Result Value Ref Range    WBC 10 51 (H) 4 31 - 10 16 Thousand/uL    RBC 4 23 3 81 - 5 12 Million/uL    Hemoglobin 11 8 11 5 - 15 4 g/dL    Hematocrit 35 4 34 8 - 46 1 %    MCV 84 82 - 98 fL    MCH 27 9 26 8 - 34 3 pg    MCHC 33 3 31 4 - 37 4 g/dL    RDW 12 9 11 6 - 15 1 %    Platelets 009 293 - 179 Thousands/uL    MPV 11 5 8 9 - 12 7 fL   RPR    Collection Time: 03/23/18  3:33 AM   Result Value Ref Range    RPR Non-Reactive Non-Reactive   Blood gas, arterial, cord    Collection Time: 03/23/18  6:59 AM   Result Value Ref Range    pH, Cord Art 7 236 7 230 - 7 430    pCO2, Cord Art 56 9 30 0 - 60 0    pO2, Cord Art 18 8 5 0 - 25 0 mm HG    HCO3, Cord Art 23 6 17 3 - 27 3 mmol/L    Base Exc, Cord Art -4 6 (L) 3 0 - 11 0 mmol/L    O2 Content, Cord Art 6 8 ml/dl    O2 Hgb, Arterial Cord 33 9 %   Blood gas, venous, cord    Collection Time: 03/23/18  6:59 AM   Result Value Ref Range    pH, Cord Anmol 7 346 7 190 - 7 490    pCO2, Cord Anmol 40 7 27 0 - 43 0 mm HG    pO2, Cord Anmol 27 3 15 0 - 45 0 mm HG    HCO3, Cord Anmol 21 8 12 2 - 28 6 mmol/L    Base Exc, Cord Anmol -3 6 (L) 1 0 - 9 0 mmol/L    O2 Cont, Cord Anmol  mL/dL    O2 HGB,VENOUS CORD 63 0 %       Evangelina Cotton MD  OBGYN, PGY3  3/24/2018 8:10 AM

## 2018-03-25 VITALS
WEIGHT: 186 LBS | BODY MASS INDEX: 35.12 KG/M2 | TEMPERATURE: 98.2 F | RESPIRATION RATE: 18 BRPM | DIASTOLIC BLOOD PRESSURE: 95 MMHG | HEIGHT: 61 IN | HEART RATE: 66 BPM | OXYGEN SATURATION: 97 % | SYSTOLIC BLOOD PRESSURE: 136 MMHG

## 2018-03-25 PROCEDURE — 99024 POSTOP FOLLOW-UP VISIT: CPT | Performed by: OBSTETRICS & GYNECOLOGY

## 2018-03-25 RX ORDER — IBUPROFEN 200 MG
600 TABLET ORAL EVERY 6 HOURS PRN
Start: 2018-03-25

## 2018-03-25 RX ORDER — ACETAMINOPHEN 325 MG/1
650 TABLET ORAL EVERY 6 HOURS PRN
Qty: 30 TABLET | Refills: 0
Start: 2018-03-25

## 2018-03-25 RX ADMIN — DOCUSATE SODIUM 100 MG: 100 CAPSULE, LIQUID FILLED ORAL at 08:34

## 2018-03-25 RX ADMIN — IBUPROFEN 600 MG: 600 TABLET ORAL at 11:59

## 2018-03-26 NOTE — CASE MANAGEMENT
Notification of Maternity Inpatient Admission/Maternity Inpatient Authorization Request  This is a Notification of Maternity Inpatient Admission/Maternity Inpatient Authorization Request to our facility [address] [facility]  Please be advised that this patient is currently in our facility under Inpatient Status  Below you will find the Birth/[place] Summary, Attending Physician and Facilitys information including NPI# and contact for the Utilization  assigned to the [facility] where the patient is receiving services  Please feel free to contact the Utilization Review Department with any questions  Mothers Information:  Keira Parker  MRN: [id number]  YOB: 1990  Admission Date: 3/23/2018  2:32 AM  Discharge Date: 3/25/2018 12:33 PM  Disposition: Home/Self Care  Admitting Diagnosis:   O80 VAGINAL DELIVERY  Amniotic fluid leaking [O42 90]   Information:  Estimated Date of Delivery: 3/23/18  Information for the patient's :  Elvira Wilson [[id number]]      Delivery Information:  Sex: male  Delivered 3/23/2018 6:53 AM by Vaginal, Spontaneous Delivery; Gestational Age: 37w0d    [place] Measurements:  Weight: 7 lb 5 oz (3317 g); Height: 20"    APGAR 1 minute 5 minutes 10 minutes   Totals: 9 9      OB History      Para Term  AB Living    3 2 2 0 1 2    SAB TAB Ectopic Multiple Live Births    0 1 0 0 2        Obstetric Comments    History of   , 2011 boy 7lbs        Attending Physician:  LUPE Charles  Specialty- Obstetrics and Gynecology  [facility] ID- [id number]  [address]   [address], [address] [place]  Phone 1: [phone]  Fax: [phone]    Facility: [address], [facility]  [phone]  Tax ID: [id number]  NPI: [id number]    [facility] in the [facility] by [facility] [employer] for 2017  Network Utilization Review Department  Phone: [phone]; Fax [phone]  ATTENTION: The Network Utilization Review Department is now centralized for our 7 Facilities  Make a note that we have a new phone and fax numbers for our Department  Please call with any questions or concerns to [phone] and carefully follow the prompts so that you are directed to the right person  All voicemails are confidential  Fax any determinations, approvals, denials, and requests for initial or continue stay review clinical to [phone]  **Due to HIGH CALL volume, it would be easier if you could please send daily logs  This will expedite your requests and in part, help us provide discharge notifications faster   **

## 2018-03-30 LAB — PLACENTA IN STORAGE: NORMAL

## 2018-04-13 ENCOUNTER — ROUTINE PRENATAL (OUTPATIENT)
Dept: OBGYN CLINIC | Facility: CLINIC | Age: 28
End: 2018-04-13
Payer: COMMERCIAL

## 2018-04-13 VITALS
WEIGHT: 160 LBS | DIASTOLIC BLOOD PRESSURE: 76 MMHG | BODY MASS INDEX: 30.23 KG/M2 | SYSTOLIC BLOOD PRESSURE: 121 MMHG | HEART RATE: 60 BPM

## 2018-04-13 PROBLEM — Z3A.40 40 WEEKS GESTATION OF PREGNANCY: Status: RESOLVED | Noted: 2018-03-13 | Resolved: 2018-04-13

## 2018-04-13 PROCEDURE — 99213 OFFICE O/P EST LOW 20 MIN: CPT | Performed by: NURSE PRACTITIONER

## 2018-04-13 NOTE — PATIENT INSTRUCTIONS
Return for annual exam after 8/24/2018  Start birth control pills as discussed  Call with needs or concerns

## 2018-04-13 NOTE — PROGRESS NOTES
OB POSTPARTUM VISIT PROGRESS NOTE  Date of Encounter: 2018    Elvis Merrill    : 1990  (24 y o )  MR: [id number]    Subjective   Dian Mass is in for her postpartum visit  She is now [id number]  She delivered by normal spontaneous vaginal delivery 3 weeks ago  She's generally doing well, denies current pain or bleeding issues, and has no significant depression issues  She is breast feeding with some supplementation  Discussed ways to increase milk supply  We discussed all appropriate contraceptive options and she chooses  OCP's She had OCP's from prior to the pregnancy, to start at 4 weeks postpartum  Denies problems with urinating or BM's  Assisted by family with the baby  LABS: None today    Objective   EXAM:  GENERAL: alert, well appearing, and in no distress  VITALS: Blood pressure 121/76, pulse 60, weight 72 6 kg (160 lb), last menstrual period 2017, currently breastfeeding  BMI: Body mass index is 30 23 kg/m² :   BREASTS:Denies pain redness or lumps  EXTREMITIES: Denies pain redness or edema    Assessment/Plan   Diagnoses and all orders for this visit:    Postpartum follow-up    Plan  Return for annual exam after 2018  Start birth control pills as discussed  Call with needs or concerns  Pt verbalized understanding of all discussed          TENA Hernadez

## 2018-08-15 ENCOUNTER — OFFICE VISIT (OUTPATIENT)
Dept: OBGYN CLINIC | Facility: CLINIC | Age: 28
End: 2018-08-15
Payer: COMMERCIAL

## 2018-08-15 VITALS
BODY MASS INDEX: 28.32 KG/M2 | WEIGHT: 150 LBS | SYSTOLIC BLOOD PRESSURE: 132 MMHG | DIASTOLIC BLOOD PRESSURE: 86 MMHG | HEIGHT: 61 IN

## 2018-08-15 DIAGNOSIS — Z01.419 ENCOUNTER FOR ANNUAL ROUTINE GYNECOLOGICAL EXAMINATION: Primary | ICD-10-CM

## 2018-08-15 DIAGNOSIS — Z30.41 ENCOUNTER FOR SURVEILLANCE OF CONTRACEPTIVE PILLS: ICD-10-CM

## 2018-08-15 PROCEDURE — 99395 PREV VISIT EST AGE 18-39: CPT | Performed by: NURSE PRACTITIONER

## 2018-08-15 RX ORDER — ACETAMINOPHEN AND CODEINE PHOSPHATE 120; 12 MG/5ML; MG/5ML
1 SOLUTION ORAL DAILY
Qty: 28 TABLET | Refills: 11 | Status: SHIPPED | OUTPATIENT
Start: 2018-08-15 | End: 2020-03-04 | Stop reason: ALTCHOICE

## 2018-08-15 NOTE — PATIENT INSTRUCTIONS
Continue birth control pills as directed  Missed or Late Pills: For combined (Estrogen and Progestin) pills:    If one hormonal pill is LATE (<24 hours since a pill should have been taken): Take the late or missed pill as soon as possible  Continue taking the remaining pills at the usual time (even if it means taking two pills on the same day)  No additional contraceptive protection is needed  Emergency contraception is not usually needed but can be considered if hormonal pills were missed earlier in the cycle or in the last week of the previous cycle  If one hormonal pill has been MISSED (24 to <48 hours since a pill should have been taken): Take the late or missed pill as soon as possible  Continue taking the remaining pills at the usual time (even if it means taking two pills on the same day)  No additional contraceptive protection is needed  Emergency contraception is not usually needed but can be considered if hormonal pills were missed earlier in the cycle or in the last week of the previous cycle  If two or more consecutive hormonal pills have been missed: (less than or equal to 48 hours since a pill should have been taken): Take the most recent missed pill as soon as possible  (Any other missed pills should be discarded ) Continue taking the remaining pills at the usual time (even if it means taking two pills on the same day)  Use back-up contraception (e g , condoms) or avoid sexual intercourse until hormonal pills have been taken for 7 consecutive days  If pills were missed in the last week of hormonal pills (e g , days 15-21 for 28-day pill packs): Omit the hormone-free interval by finishing the horomal pills in the current pack and starting a new pack the next day  If unable to start a new pack immediately, use back-up contraception (e g , condoms) or avoid sexual intercourse until hormonal pills from a new pack have been taken for 7 consecutive days   Emergency contraception should be considered if hormonal pills were missed during the first week and unprotected sexual intercourse occurred in the previous 5 days  Emergency contraception may also be considered at other times as appropriate  Source: [place]  Selected Practice Recommendations for Contraceptive Use, 2013      Call with increased H/A  Call with other needs or concerns  Return in 1 year

## 2018-08-15 NOTE — PROGRESS NOTES
Assessment   1  Encounter for annual routine gynecological examination     2  Encounter for surveillance of contraceptive pills  norethindrone (MICRONOR) 0 35 MG tablet               Plan      All questions answered  Breast self exam technique reviewed and patient encouraged to perform self-exam monthly  Contraception: progesterone only pill, noting H/A no aura  Discussed healthy lifestyle modifications  Follow up in 1 year  Continue birth control pills as directed  Missed or Late Pills: For combined (Estrogen and Progestin) pills:    If one hormonal pill is LATE (<24 hours since a pill should have been taken): Take the late or missed pill as soon as possible  Continue taking the remaining pills at the usual time (even if it means taking two pills on the same day)  No additional contraceptive protection is needed  Emergency contraception is not usually needed but can be considered if hormonal pills were missed earlier in the cycle or in the last week of the previous cycle  If one hormonal pill has been MISSED (24 to <48 hours since a pill should have been taken): Take the late or missed pill as soon as possible  Continue taking the remaining pills at the usual time (even if it means taking two pills on the same day)  No additional contraceptive protection is needed  Emergency contraception is not usually needed but can be considered if hormonal pills were missed earlier in the cycle or in the last week of the previous cycle  If two or more consecutive hormonal pills have been missed: (less than or equal to 48 hours since a pill should have been taken): Take the most recent missed pill as soon as possible  (Any other missed pills should be discarded ) Continue taking the remaining pills at the usual time (even if it means taking two pills on the same day)  Use back-up contraception (e g , condoms) or avoid sexual intercourse until hormonal pills have been taken for 7 consecutive days   If pills were missed in the last week of hormonal pills (e g , days 15-21 for 28-day pill packs): Omit the hormone-free interval by finishing the horomal pills in the current pack and starting a new pack the next day  If unable to start a new pack immediately, use back-up contraception (e g , condoms) or avoid sexual intercourse until hormonal pills from a new pack have been taken for 7 consecutive days  Emergency contraception should be considered if hormonal pills were missed during the first week and unprotected sexual intercourse occurred in the previous 5 days  Emergency contraception may also be considered at other times as appropriate  Source: [place]  Selected Practice Recommendations for Contraceptive Use, 2013  Call with increased H/A  Call with other needs or concerns  Return in 1 year  Pt verbalized understanding of all discussed  Subjective      Evy Estrada is a 24 y o  female who presents for annual exam  Periods are regular every 28-30 days, lasting 3 days  Dysmenorrhea:none  Cyclic symptoms include none  No intermenstrual bleeding, spotting, or discharge  The patient reports that there is not domestic violence in her life  1 partner x 10 years  Last WNL PAP 2016  Using OCP's for birth control would like to continue, states she does have intermittent H/A, wants progesterone only pills  Current contraception: progesterone onl;y pills  History of abnormal Pap smear: no  Family history of uterine or ovarian cancer: no  Regular self breast exam: yes  History of abnormal mammogram: N/A  Family history of breast cancer: no  History of abnormal lipids: unknown  Menstrual History:  OB History      Para Term  AB Living    3 2 2 0 1 2    SAB TAB Ectopic Multiple Live Births    0 1 0 0 2        Obstetric Comments    History of   , 2011 boy 7lbs         Menarche age: 17  Patient's last menstrual period was 2018         The following portions of the patient's history were reviewed and updated as appropriate: allergies, current medications, past family history, past medical history, past social history, past surgical history and problem list     Review of Systems  Pertinent items are noted in HPI  Objective      /86   Ht 5' 0 6" (1 539 m)   Wt 68 kg (150 lb)   LMP 08/05/2018   Breastfeeding?  No   BMI 28 72 kg/m²     General:   alert and oriented, in no acute distress, alert, appears stated age and cooperative   Heart: regular rate and rhythm, S1, S2 normal, no murmur, click, rub or gallop   Lungs: clear to auscultation bilaterally   Thyroid negative masses   Abdomen: soft, non-tender, without masses or organomegaly   Vulva: normal   Vagina: normal mucosa   Cervix: no cervical motion tenderness and no lesions   Uterus: normal size, normal shape and consistency   Adnexa: normal adnexa   Breasts Nt, negative masses, discharge or dimpling

## 2018-12-10 ENCOUNTER — HOSPITAL ENCOUNTER (EMERGENCY)
Facility: HOSPITAL | Age: 28
Discharge: HOME | End: 2018-12-10
Attending: EMERGENCY MEDICINE | Admitting: EMERGENCY MEDICINE
Payer: COMMERCIAL

## 2018-12-10 VITALS
RESPIRATION RATE: 18 BRPM | WEIGHT: 143.3 LBS | SYSTOLIC BLOOD PRESSURE: 127 MMHG | TEMPERATURE: 98.1 F | HEART RATE: 86 BPM | OXYGEN SATURATION: 99 % | DIASTOLIC BLOOD PRESSURE: 72 MMHG

## 2018-12-10 DIAGNOSIS — Z34.90 PREGNANCY, UNSPECIFIED GESTATIONAL AGE: Primary | ICD-10-CM

## 2018-12-10 DIAGNOSIS — R10.9 ABDOMINAL DISCOMFORT: ICD-10-CM

## 2018-12-10 DIAGNOSIS — E87.6 HYPOKALEMIA: ICD-10-CM

## 2018-12-10 DIAGNOSIS — E86.0 DEHYDRATION: ICD-10-CM

## 2018-12-10 DIAGNOSIS — R11.2 NAUSEA & VOMITING: ICD-10-CM

## 2018-12-10 LAB
ALBUMIN SERPL BCP-MCNC: 3.6 G/DL (ref 3.5–5)
ALP SERPL-CCNC: 63 U/L (ref 46–116)
ALT SERPL W P-5'-P-CCNC: 20 U/L (ref 12–78)
ANION GAP SERPL CALCULATED.3IONS-SCNC: 9 MMOL/L (ref 4–13)
AST SERPL W P-5'-P-CCNC: 11 U/L (ref 5–45)
BASOPHILS # BLD AUTO: 0.04 THOUSANDS/ΜL (ref 0–0.1)
BASOPHILS NFR BLD AUTO: 0 % (ref 0–1)
BILIRUB SERPL-MCNC: 0.5 MG/DL (ref 0.2–1)
BILIRUB UR QL STRIP: NEGATIVE
BUN SERPL-MCNC: 10 MG/DL (ref 5–25)
CALCIUM SERPL-MCNC: 8.3 MG/DL (ref 8.3–10.1)
CHLORIDE SERPL-SCNC: 102 MMOL/L (ref 100–108)
CLARITY UR: ABNORMAL
CO2 SERPL-SCNC: 25 MMOL/L (ref 21–32)
COLOR UR: YELLOW
CREAT SERPL-MCNC: 0.54 MG/DL (ref 0.6–1.3)
EOSINOPHIL # BLD AUTO: 0.05 THOUSAND/ΜL (ref 0–0.61)
EOSINOPHIL NFR BLD AUTO: 1 % (ref 0–6)
ERYTHROCYTE [DISTWIDTH] IN BLOOD BY AUTOMATED COUNT: 12 % (ref 11.6–15.1)
EXT PREG TEST URINE: POSITIVE
FLUAV AG SPEC QL IA: NEGATIVE
FLUAV AG SPEC QL: NORMAL
FLUBV AG SPEC QL IA: NEGATIVE
FLUBV AG SPEC QL: NORMAL
GFR SERPL CREATININE-BSD FRML MDRD: 131 ML/MIN/1.73SQ M
GLUCOSE SERPL-MCNC: 86 MG/DL (ref 65–140)
GLUCOSE UR STRIP-MCNC: NEGATIVE MG/DL
HCT VFR BLD AUTO: 35.3 % (ref 34.8–46.1)
HGB BLD-MCNC: 12 G/DL (ref 11.5–15.4)
HGB UR QL STRIP.AUTO: NEGATIVE
IMM GRANULOCYTES # BLD AUTO: 0.02 THOUSAND/UL (ref 0–0.2)
IMM GRANULOCYTES NFR BLD AUTO: 0 % (ref 0–2)
KETONES UR STRIP-MCNC: ABNORMAL MG/DL
LACTATE SERPL-SCNC: 0.5 MMOL/L (ref 0.5–2)
LEUKOCYTE ESTERASE UR QL STRIP: NEGATIVE
LIPASE SERPL-CCNC: 87 U/L (ref 73–393)
LYMPHOCYTES # BLD AUTO: 3.56 THOUSANDS/ΜL (ref 0.6–4.47)
LYMPHOCYTES NFR BLD AUTO: 33 % (ref 14–44)
MAGNESIUM SERPL-MCNC: 1.7 MG/DL (ref 1.6–2.6)
MCH RBC QN AUTO: 28.3 PG (ref 26.8–34.3)
MCHC RBC AUTO-ENTMCNC: 34 G/DL (ref 31.4–37.4)
MCV RBC AUTO: 83 FL (ref 82–98)
MONOCYTES # BLD AUTO: 0.84 THOUSAND/ΜL (ref 0.17–1.22)
MONOCYTES NFR BLD AUTO: 8 % (ref 4–12)
NEUTROPHILS # BLD AUTO: 6.14 THOUSANDS/ΜL (ref 1.85–7.62)
NEUTS SEG NFR BLD AUTO: 58 % (ref 43–75)
NITRITE UR QL STRIP: NEGATIVE
NRBC BLD AUTO-RTO: 0 /100 WBCS
PH UR STRIP.AUTO: 5.5 [PH] (ref 4.5–8)
PLATELET # BLD AUTO: 301 THOUSANDS/UL (ref 149–390)
PMV BLD AUTO: 10.1 FL (ref 8.9–12.7)
POTASSIUM SERPL-SCNC: 3.2 MMOL/L (ref 3.5–5.3)
PROT SERPL-MCNC: 6.7 G/DL (ref 6.4–8.2)
PROT UR STRIP-MCNC: NEGATIVE MG/DL
RBC # BLD AUTO: 4.24 MILLION/UL (ref 3.81–5.12)
RSV B RNA SPEC QL NAA+PROBE: NORMAL
SODIUM SERPL-SCNC: 136 MMOL/L (ref 136–145)
SP GR UR STRIP.AUTO: >=1.03 (ref 1–1.03)
UROBILINOGEN UR QL STRIP.AUTO: 0.2 E.U./DL
WBC # BLD AUTO: 10.65 THOUSAND/UL (ref 4.31–10.16)

## 2018-12-10 PROCEDURE — 81003 URINALYSIS AUTO W/O SCOPE: CPT | Performed by: EMERGENCY MEDICINE

## 2018-12-10 PROCEDURE — 85025 COMPLETE CBC W/AUTO DIFF WBC: CPT | Performed by: EMERGENCY MEDICINE

## 2018-12-10 PROCEDURE — 80053 COMPREHEN METABOLIC PANEL: CPT | Performed by: EMERGENCY MEDICINE

## 2018-12-10 PROCEDURE — 99284 EMERGENCY DEPT VISIT MOD MDM: CPT

## 2018-12-10 PROCEDURE — 87631 RESP VIRUS 3-5 TARGETS: CPT | Performed by: EMERGENCY MEDICINE

## 2018-12-10 PROCEDURE — 83735 ASSAY OF MAGNESIUM: CPT | Performed by: EMERGENCY MEDICINE

## 2018-12-10 PROCEDURE — 81025 URINE PREGNANCY TEST: CPT | Performed by: EMERGENCY MEDICINE

## 2018-12-10 PROCEDURE — 96360 HYDRATION IV INFUSION INIT: CPT

## 2018-12-10 PROCEDURE — 36415 COLL VENOUS BLD VENIPUNCTURE: CPT | Performed by: EMERGENCY MEDICINE

## 2018-12-10 PROCEDURE — 83690 ASSAY OF LIPASE: CPT | Performed by: EMERGENCY MEDICINE

## 2018-12-10 PROCEDURE — 83605 ASSAY OF LACTIC ACID: CPT | Performed by: EMERGENCY MEDICINE

## 2018-12-10 RX ORDER — ONDANSETRON 2 MG/ML
4 INJECTION INTRAMUSCULAR; INTRAVENOUS ONCE
Status: DISCONTINUED | OUTPATIENT
Start: 2018-12-10 | End: 2018-12-10

## 2018-12-10 RX ORDER — POTASSIUM CHLORIDE 20 MEQ/1
20 TABLET, EXTENDED RELEASE ORAL ONCE
Status: COMPLETED | OUTPATIENT
Start: 2018-12-10 | End: 2018-12-10

## 2018-12-10 RX ORDER — METOCLOPRAMIDE 10 MG/1
5 TABLET ORAL ONCE
Status: COMPLETED | OUTPATIENT
Start: 2018-12-10 | End: 2018-12-10

## 2018-12-10 RX ADMIN — SODIUM CHLORIDE 1000 ML: 0.9 INJECTION, SOLUTION INTRAVENOUS at 03:56

## 2018-12-10 RX ADMIN — METOCLOPRAMIDE HYDROCHLORIDE 5 MG: 10 TABLET ORAL at 03:54

## 2018-12-10 RX ADMIN — POTASSIUM CHLORIDE 20 MEQ: 1500 TABLET, EXTENDED RELEASE ORAL at 04:25

## 2018-12-10 NOTE — ED PROVIDER NOTES
History  Chief Complaint   Patient presents with    Abdominal Pain     Pt c/o abdominal pain for the past 2 days with N/V - denies urinary sx     Patient is a healthy 27-year-old female coming in today for multiple complaints  She states that she is otherwise healthy  All her symptoms started approximately 2-3 days ago  She describes a generalized abdominal discomfort described as achy and cramping  This is intermittent and worsening with eating  She nauseated without any vomiting  She does have intermittent sore throat as well  She has no documented fever; however, does feel "hot and cold all the time"  She has been urinating well without any dysuria  She has no melena or bright red blood per rectum  She has no vaginal bleeding spotting or discharge  She has no recent travel, sick contacts or recent antibiotic use  Patient did not take anything for her pain or cramping  She has no otalgia, chest pain, shortness of breath, cough          History provided by:  Patient   used: No    Abdominal Pain   Pain location:  Generalized  Pain quality: aching, cramping and pressure    Pain radiates to:  Does not radiate  Pain severity:  Moderate  Onset quality:  Gradual  Timing:  Constant  Progression:  Unchanged  Chronicity:  New  Context: not alcohol use, not awakening from sleep, not diet changes, not eating, not laxative use, not medication withdrawal, not previous surgeries, not recent illness, not recent sexual activity, not recent travel, not retching, not sick contacts, not suspicious food intake and not trauma    Relieved by:  None tried  Worsened by:  Nothing  Ineffective treatments:  None tried  Associated symptoms: anorexia, chills, nausea and sore throat    Associated symptoms: no belching, no chest pain, no constipation, no cough, no diarrhea, no dysuria, no fatigue, no fever, no flatus, no hematemesis, no hematochezia, no hematuria, no melena, no shortness of breath, no vaginal bleeding, no vaginal discharge and no vomiting    Risk factors: no alcohol abuse, no aspirin use, not elderly, has not had multiple surgeries, no NSAID use, not obese, not pregnant and no recent hospitalization        None       History reviewed  No pertinent past medical history  History reviewed  No pertinent surgical history  History reviewed  No pertinent family history  I have reviewed and agree with the history as documented  Social History   Substance Use Topics    Smoking status: Never Smoker    Smokeless tobacco: Never Used    Alcohol use No        Review of Systems   Constitutional: Positive for chills  Negative for diaphoresis, fatigue and fever  HENT: Positive for sore throat  Negative for ear pain  Eyes: Negative for visual disturbance  Respiratory: Negative for cough, chest tightness and shortness of breath  Cardiovascular: Negative for chest pain and palpitations  Gastrointestinal: Positive for abdominal pain, anorexia and nausea  Negative for constipation, diarrhea, flatus, hematemesis, hematochezia, melena and vomiting  Genitourinary: Negative for difficulty urinating, dysuria, hematuria, vaginal bleeding and vaginal discharge  Musculoskeletal: Positive for myalgias  Negative for back pain and neck pain  Skin: Negative for rash  Neurological: Negative for weakness  Psychiatric/Behavioral: Negative for confusion  Physical Exam  Physical Exam   Constitutional: She is oriented to person, place, and time  She appears well-developed and well-nourished  No distress  HENT:   Head: Normocephalic and atraumatic  Right Ear: Hearing and external ear normal    Left Ear: Hearing and external ear normal    Nose: Nose normal    Mouth/Throat: Oropharynx is clear and moist    Patient is maintaining airway maintaining secretions  Uvula midline without edema  Posterior pharyngeal erythema without exudates   Eyes: Pupils are equal, round, and reactive to light  Conjunctivae and EOM are normal    Neck: Normal range of motion  Neck supple  Cardiovascular: Normal rate, regular rhythm, normal heart sounds and intact distal pulses  No murmur heard  Pulmonary/Chest: Effort normal and breath sounds normal  No stridor  No respiratory distress  Abdominal: Soft  Bowel sounds are normal  She exhibits no distension  There is generalized tenderness  There is no rigidity, no rebound, no guarding, no CVA tenderness, no tenderness at McBurney's point and negative Mckeon's sign  Abdomen is non peritoneal   Negative Rovsing's, obturator, psoas sign   Musculoskeletal: Normal range of motion  She exhibits no edema  Neurological: She is alert and oriented to person, place, and time  No cranial nerve deficit  Skin: Skin is warm  Capillary refill takes less than 2 seconds  She is not diaphoretic  Nursing note and vitals reviewed        Vital Signs  ED Triage Vitals [12/10/18 0328]   Temperature Pulse Respirations Blood Pressure SpO2   98 1 °F (36 7 °C) 86 18 127/72 99 %      Temp Source Heart Rate Source Patient Position - Orthostatic VS BP Location FiO2 (%)   Temporal Monitor Sitting Left arm --      Pain Score       Worst Possible Pain           Vitals:    12/10/18 0328   BP: 127/72   Pulse: 86   Patient Position - Orthostatic VS: Sitting       Visual Acuity      ED Medications  Medications   sodium chloride 0 9 % bolus 1,000 mL (1,000 mL Intravenous New Bag 12/10/18 0356)   metoclopramide (REGLAN) tablet 5 mg (5 mg Oral Given 12/10/18 0354)   potassium chloride (K-DUR,KLOR-CON) CR tablet 20 mEq (20 mEq Oral Given 12/10/18 0425)       Diagnostic Studies  Results Reviewed     Procedure Component Value Units Date/Time    Lactic acid, plasma [[id number]]  (Normal) Collected:  12/10/18 0346    Lab Status:  Final result Specimen:  Blood from Arm, Right Updated:  12/10/18 0414     LACTIC ACID 0 5 mmol/L     Narrative:         Result may be elevated if tourniquet was used during collection  Rapid Influenza Screen with Reflex PCR [[id number]]  (Normal) Collected:  12/10/18 0347    Lab Status:  Final result Specimen:  Nasopharyngeal from Nasopharyngeal Swab Updated:  12/10/18 0414     Rapid Influenza A Ag Negative     Rapid Influenza B Ag Negative    Influenza A/B and RSV by PCR [[id number]] Collected:  12/10/18 0347    Lab Status: In process Specimen:  Nasopharyngeal from Nasopharyngeal Swab Updated:  12/10/18 0413    Comprehensive metabolic panel [[id number]]  (Abnormal) Collected:  12/10/18 0346    Lab Status:  Final result Specimen:  Blood from Arm, Right Updated:  12/10/18 0411     Sodium 136 mmol/L      Potassium 3 2 (L) mmol/L      Chloride 102 mmol/L      CO2 25 mmol/L      ANION GAP 9 mmol/L      BUN 10 mg/dL      Creatinine 0 54 (L) mg/dL      Glucose 86 mg/dL      Calcium 8 3 mg/dL      AST 11 U/L      ALT 20 U/L      Alkaline Phosphatase 63 U/L      Total Protein 6 7 g/dL      Albumin 3 6 g/dL      Total Bilirubin 0 50 mg/dL      eGFR 131 ml/min/1 73sq m     Narrative:         National Kidney Disease Education Program recommendations are as follows:  GFR calculation is accurate only with a steady state creatinine  Chronic Kidney disease less than 60 ml/min/1 73 sq  meters  Kidney failure less than 15 ml/min/1 73 sq  meters      Magnesium [[id number]]  (Normal) Collected:  12/10/18 0346    Lab Status:  Final result Specimen:  Blood from Arm, Right Updated:  12/10/18 0411     Magnesium 1 7 mg/dL     Lipase [[id number]]  (Normal) Collected:  12/10/18 0346    Lab Status:  Final result Specimen:  Blood from Arm, Right Updated:  12/10/18 0411     Lipase 87 u/L     UA w Reflex to Microscopic [[id number]]  (Abnormal) Collected:  12/10/18 0349    Lab Status:  Final result Specimen:  Urine from Urine, Clean Catch Updated:  12/10/18 0356     Color, UA Yellow     Clarity, UA Slightly Cloudy     Specific Gravity, UA >=1 030     pH, UA 5 5     Leukocytes, UA Negative     Nitrite, UA Negative Protein, UA Negative mg/dl      Glucose, UA Negative mg/dl      Ketones, UA 15 (1+) (A) mg/dl      Urobilinogen, UA 0 2 E U /dl      Bilirubin, UA Negative     Blood, UA Negative    CBC and differential [[id number]]  (Abnormal) Collected:  12/10/18 0346    Lab Status:  Final result Specimen:  Blood from Arm, Right Updated:  12/10/18 0356     WBC 10 65 (H) Thousand/uL      RBC 4 24 Million/uL      Hemoglobin 12 0 g/dL      Hematocrit 35 3 %      MCV 83 fL      MCH 28 3 pg      MCHC 34 0 g/dL      RDW 12 0 %      MPV 10 1 fL      Platelets 513 Thousands/uL      nRBC 0 /100 WBCs      Neutrophils Relative 58 %      Immat GRANS % 0 %      Lymphocytes Relative 33 %      Monocytes Relative 8 %      Eosinophils Relative 1 %      Basophils Relative 0 %      Neutrophils Absolute 6 14 Thousands/µL      Immature Grans Absolute 0 02 Thousand/uL      Lymphocytes Absolute 3 56 Thousands/µL      Monocytes Absolute 0 84 Thousand/µL      Eosinophils Absolute 0 05 Thousand/µL      Basophils Absolute 0 04 Thousands/µL     POCT pregnancy, urine [[id number]]  (Abnormal) Resulted:  12/10/18 0349    Lab Status:  Final result Updated:  12/10/18 0349     EXT PREG TEST UR (Ref: Negative) positive                 No orders to display              Procedures  Procedures       Phone Contacts  ED Phone Contact    ED Course                               MDM  Number of Diagnoses or Management Options  Abdominal discomfort:   Dehydration:   Hypokalemia:   Nausea & vomiting:   Pregnancy, unspecified gestational age:   Diagnosis management comments:   Patient is a healthy 21-year-old female coming in today with diffuse complaints  On exam she is nontoxic appearing in no apparent distress  Will obtain labs, IV fluids as well as influenza testing      Differential diagnosis includes but not limited to:  AAA, mesenteric ischemia, pancreatitis, cholecystitis, choledocholithiasis, hepatitis, bowel obstruction, ileus, gastroenteritis, colitis, malignancy, AAA, perforation, toxicologic poisoning, renal infarct, acute kidney injury, splenic infarct, splenic injury, nephrolithiasis, UTI, muscular strain, intra-abdominal hematoma, ovarian torsion, ovarian cyst,    3:52 AM  Urine pregnancy positive  Patient states she did have her period last month  She has no vaginal bleeding spotting or discharge  Discussed with patient need for follow-up with gynecologist   I discussed with her at this point ultrasound would not show any pregnancy given her most recent diagnosis  Will continue with IV fluids as well as laboratory data checked  Continue with influenza check as well  Discussed with patient regarding Zofran and it is most recent findings of potential harm in the 1st trimester  Patient wishes for trial of Reglan  4:20 AM  Labs reveal mild hypokalemia  Will replace  At this time patient is sleeping  Atrophy IV fluids will DC home  Patient does have ketones in her urine  However negative nitrates and leukocytes  No evidence of end-organ damage  No influenza  4:35 AM  IV fluids finished  Patient updated on labs including low-potassium  She is aware of no end-organ damage, negative influenza testing, no evidence of anion gap acidosis or DKA  Valerio Pratt She has had no vomiting here and tolerated Reglan  Discussed with patient need to follow up with PCP as well as gynecologist   Will give name of gynecologist in the area she is originally from [facility]  Updated patient that she can also only take Tylenol as needed for pain as well as to start prenatal vitamins  Portions of the record may have been created with voice recognition software  Occasional wrong word or "sound a like" substitutions may have occurred due to the inherent limitations of voice recognition software  Read the chart carefully and recognize, using context, where substitutions have occurred           Amount and/or Complexity of Data Reviewed  Clinical lab tests: ordered and reviewed  Tests in the medicine section of CPT®: ordered and reviewed      CritCare Time    Disposition  Final diagnoses:   Pregnancy, unspecified gestational age   Abdominal discomfort   Nausea & vomiting   Hypokalemia   Dehydration     Time reflects when diagnosis was documented in both MDM as applicable and the Disposition within this note     Time User Action Codes Description Comment    12/10/2018  3:53 AM Slime Sanes L Add [Z34 90] Pregnancy, unspecified gestational age     12/10/2018  3:53 AM Slime Sanes L Add [R10 9] Abdominal discomfort     12/10/2018  3:53 AM BendockMarquez L Add [R11 2] Nausea & vomiting     12/10/2018  4:12 AM Bendock, Eladia L Add [E87 6] Hypokalemia     12/10/2018  4:21 AM BendockMorelia Asa Add [E86 0] Dehydration       ED Disposition     ED Disposition Condition Comment    Discharge  Levern Fothergill discharge to home/self care  Condition at discharge: Stable        Follow-up Information     Follow up With Specialties Details Why Jose J Sesay MD [facility] Medicine Schedule an appointment as soon as possible for a visit in 1 day  [address] [place] [address]      Tal Causey MD Obstetrics and Gynecology, Gynecology, Obstetrics Schedule an appointment as soon as possible for a visit in 1 day  2018 [address],  [place] [id number] [phone]            Patient's Medications    No medications on file     No discharge procedures on file      ED Provider  Electronically Signed by           Harrison Stovall DO  12/10/18 6667

## 2018-12-10 NOTE — ED NOTES
Patient resting comfortably in bed with tv on and lights down  Call bell within reach        Obey Isabel, RN  12/10/18 2529

## 2018-12-10 NOTE — LETTER
December 10, 2018     Patient: Hollie Pollard   YOB: 1990   Date of Visit: 12/10/2018       To Whom It May Concern: It is my medical opinion that Hollie Pollard may return to work on 12/12/18  If you have any questions or concerns, please don't hesitate to call  Sincerely,    Sherrian Paget Bendock, DO      No name on file      CC: No Recipients

## 2018-12-10 NOTE — DISCHARGE INSTRUCTIONS
Your pregnant  He can only take Tylenol as needed for pain  Call immediately for follow-up gynecology appointment  Maintain a brat diet(bananas, rice, applesauce and toast)  Start taking prenatal vitamins immediately  Dehydration   WHAT YOU NEED TO KNOW:   Dehydration is a condition that develops when your body does not have enough fluid  You may become dehydrated if you do not drink enough water or lose too much fluid  Fluid loss may also cause loss of electrolytes (minerals), such as sodium  DISCHARGE INSTRUCTIONS:   Seek care immediately if:   · You have a seizure  · You are confused or cannot think clearly  · You are extremely sleepy, or another person cannot wake you  · You become dizzy or faint when you stand  · You are not able to urinate  · You have trouble breathing  · You have a fast or irregular heartbeat  · Your hands or feet are cold, or your face is pale  Contact your healthcare provider if:   · You have trouble drinking liquids because you are vomiting  · Your symptoms get worse  · You have a fever  · You feel very weak or tired  · You have questions or concerns about your condition or care  Follow up with your healthcare provider as directed:  Write down your questions so you remember to ask them during your visits  Prevent or manage dehydration:   · Drink liquids as directed  Liquids that contain water, sugar, and minerals can help your body hold in fluid and help prevent dehydration  Drink liquids throughout the day, not just when you feel thirsty  Men should drink about 3 liters (13 eight-ounce cups) of liquid each day  Women should drink about 2 liters (9 eight-ounce cups) of liquid each day  Drink even more liquid if you will be outdoors, in the sun for a long time, or exercising  · Stay cool  Limit the time you spend outdoors during the hottest part of the day  Dress in lightweight clothes  · Keep track of how often you urinate    If you urinate less than usual or your urine is darker, drink more liquids  © 2017 [address] [employer] Information is for End User's use only and may not be sold, redistributed or otherwise used for commercial purposes  All illustrations and images included in CareNotes® are the copyrighted property of [employer]  or Zhou Thompson  The above information is an  only  It is not intended as medical advice for individual conditions or treatments  Talk to your doctor, nurse or pharmacist before following any medical regimen to see if it is safe and effective for you  Make sure you call your gynecologist for follow-up  Your low in potassium  Eat foods are high in potassium such as: Old milk, orange juice, tomato juice  avocados, black beans, skin of a baked potatoes, bananas  Hypokalemia   WHAT YOU NEED TO KNOW:   Hypokalemia is a low level of potassium in your blood  Potassium helps control how your muscles, heart, and digestive system work  Hypokalemia occurs when your body loses too much potassium or does not absorb enough from food  DISCHARGE INSTRUCTIONS:   Seek care immediately if:   · You cannot move your arm or leg  · You have a fast or irregular heartbeat  · You are too tired or weak to stand up  Contact your healthcare provider if:   · You are vomiting, or you have diarrhea  · You have numbness or tingling in your arms or legs  · Your symptoms do not go away or they get worse  · You have questions or concerns about your condition or care  Medicines:   · Potassium  will be given to bring your potassium levels back to normal     · Take your medicine as directed  Contact your healthcare provider if you think your medicine is not helping or if you have side effects  Tell him of her if you are allergic to any medicine  Keep a list of the medicines, vitamins, and herbs you take  Include the amounts, and when and why you take them   Bring the list or the pill bottles to follow-up visits  Carry your medicine list with you in case of an emergency  Eat foods that are high in potassium:  Foods that are high in potassium include bananas, oranges, tomatoes, potatoes, and avocado  Mayes beans, turkey, salmon, lean beef, yogurt, and milk are also high in potassium  Ask your healthcare provider or dietitian for more information about foods that are high in potassium  Follow up with your healthcare provider as directed:  Write down your questions so you remember to ask them during your visits  © 2017 [address] [facility] Information is for End User's use only and may not be sold, redistributed or otherwise used for commercial purposes  All illustrations and images included in CareNotes® are the copyrighted property of [employer]  or Zhou Thompson  The above information is an  only  It is not intended as medical advice for individual conditions or treatments  Talk to your doctor, nurse or pharmacist before following any medical regimen to see if it is safe and effective for you  Abdominal Pain   WHAT YOU NEED TO KNOW:   Abdominal pain can be dull, achy, or sharp  You may have pain in one area of your abdomen, or in your entire abdomen  Your pain may be caused by a condition such as constipation, food sensitivity or poisoning, infection, or a blockage  Abdominal pain can also be from a hernia, appendicitis, or an ulcer  Liver, gallbladder, or kidney conditions can also cause abdominal pain  The cause of your abdominal pain may be unknown  DISCHARGE INSTRUCTIONS:   Return to the emergency department if:   · You have new chest pain or shortness of breath  · You have pulsing pain in your upper abdomen or lower back that suddenly becomes constant  · Your pain is in the right lower abdominal area and worsens with movement  · You have a fever over 100 4°F (38°C) or shaking chills       · You are vomiting and cannot keep food or liquids down      · Your pain does not improve or gets worse over the next 8 to 12 hours  · You see blood in your vomit or bowel movements, or they look black and tarry  · Your skin or the whites of your eyes turn yellow  · You are a woman and have a large amount of vaginal bleeding that is not your monthly period  Contact your healthcare provider if:   · You have pain in your lower back  · You are a man and have pain in your testicles  · You have pain when you urinate  · You have questions or concerns about your condition or care  Follow up with your healthcare provider within 24 hours or as directed:  Write down your questions so you remember to ask them during your visits  Medicines:   · Medicines  may be given to calm your stomach and prevent vomiting or to decrease pain  Ask how to take pain medicine safely  · Take your medicine as directed  Contact your healthcare provider if you think your medicine is not helping or if you have side effects  Tell him of her if you are allergic to any medicine  Keep a list of the medicines, vitamins, and herbs you take  Include the amounts, and when and why you take them  Bring the list or the pill bottles to follow-up visits  Carry your medicine list with you in case of an emergency  © 2017 [address] [employer] Information is for End User's use only and may not be sold, redistributed or otherwise used for commercial purposes  All illustrations and images included in CareNotes® are the copyrighted property of [employer]  or Zhou Thompson  The above information is an  only  It is not intended as medical advice for individual conditions or treatments  Talk to your doctor, nurse or pharmacist before following any medical regimen to see if it is safe and effective for you  Acute Abdominal Pain   WHAT YOU NEED TO KNOW:   The cause of your abdominal pain may not be found   If a cause is found, treatment will depend on what the cause is  DISCHARGE INSTRUCTIONS:   Seek care immediately if:   · You vomit blood or cannot stop vomiting  · You have blood in your bowel movement or it looks like tar  · You have bleeding from your rectum  · Your abdomen is larger than usual, more painful, and hard  · You have severe pain in your abdomen  · You stop passing gas and having bowel movements  · You feel weak, dizzy, or faint  Contact your healthcare provider if:   · You have a fever  · You have new signs and symptoms  · Your symptoms do not get better with treatment  · You have questions or concerns about your condition or care  Medicines  may be given to decrease pain, treat an infection, and manage your symptoms  Take your medicine as directed  Call your healthcare provider if you think your medicine is not helping or if you have side effects  Tell him if you are allergic to any medicine  Keep a list of the medicines, vitamins, and herbs you take  Include the amounts, and when and why you take them  Bring the list or the pill bottles to follow-up visits  Carry your medicine list with you in case of an emergency  Manage your symptoms:   · Apply heat  on your abdomen for 20 to 30 minutes every 2 hours for as many days as directed  Heat helps decrease pain and muscle spasms  · Manage your stress  Stress may cause abdominal pain  Your healthcare provider may recommend relaxation techniques and deep breathing exercises to help decrease your stress  Your healthcare provider may recommend you talk to someone about your stress or anxiety, such as a counselor or a trusted friend  Get plenty of sleep and exercise regularly  · Limit or do not drink alcohol  Alcohol can make your abdominal pain worse  Ask your healthcare provider if it is safe for you to drink alcohol  Also ask how much is safe for you to drink  · Do not smoke    Nicotine and other chemicals in cigarettes can damage your esophagus and stomach  Ask your healthcare provider for information if you currently smoke and need help to quit  E-cigarettes or smokeless tobacco still contain nicotine  Talk to your healthcare provider before you use these products  Make changes to the food you eat as directed:  Do not eat foods that cause abdominal pain or other symptoms  Eat small meals more often  · Eat more high-fiber foods if you are constipated  High-fiber foods include fruits, vegetables, whole-grain foods, and legumes  · Do not eat foods that cause gas if you have bloating  Examples include broccoli, cabbage, and cauliflower  Do not drink soda or carbonated drinks, because these may also cause gas  · Do not eat foods or drinks that contain sorbitol or fructose if you have diarrhea and bloating  Some examples are fruit juices, candy, jelly, and sugar-free gum  · Do not eat high-fat foods, such as fried foods, cheeseburgers, hot dogs, and desserts  · Limit or do not drink caffeine  Caffeine may make symptoms, such as heart burn or nausea, worse  · Drink plenty of liquids to prevent dehydration from diarrhea or vomiting  Ask your healthcare provider how much liquid to drink each day and which liquids are best for you  Follow up with your healthcare provider as directed:  Write down your questions so you remember to ask them during your visits  © 2017 [facility] Information is for End User's use only and may not be sold, redistributed or otherwise used for commercial purposes  All illustrations and images included in CareNotes® are the copyrighted property of [employer]  or Zhou Thompson  The above information is an  only  It is not intended as medical advice for individual conditions or treatments  Talk to your doctor, nurse or pharmacist before following any medical regimen to see if it is safe and effective for you      Abdominal Pain in Pregnancy   WHAT YOU NEED TO KNOW:   Abdominal pain during pregnancy is common  Some of the causes include heartburn, constipation, gas, false labor, and round ligament pain  Round ligament pain is caused by stretching of the ligaments that support your uterus  Abdominal pain may be caused by a health problem, such as a stomach virus or appendicitis (inflammation of the appendix)  The pain may also be caused by a problem with your pregnancy, such as a threatened miscarriage or  labor  DISCHARGE INSTRUCTIONS:   Follow up with your obstetrician within 3 days:  Write down your questions so you remember to ask them during your visits  Self-care:   · Rest may help to relieve round ligament pain  Ask your healthcare provider about other ways to relieve this pain, such as a supportive belt or pregnancy exercises  · Use a heating pad set to the lowest setting or a hot compress to apply heat to your abdomen  Do this for 20 to 30 minutes every 2 hours for as many days as directed  · Avoid quick changes in position or movements that cause pain  · Do not lie flat in bed or bend over if you have heartburn  Ask your obstetrician if you should make any changes to the foods you eat  Ask if you can take any medicines for heartburn  · Eat more fiber and drink more liquids to relieve constipation  Fiber is found in fruits, vegetables, and whole-grain foods, such as whole-wheat bread and cereals  Ask how much liquid to drink each day and which liquids are best for you  Contact your obstetrician if:  · You continue to have abdominal pain that cannot be relieved  · You have a fever  · You have questions or concerns about your condition or care  Return to the emergency department if:   · You have sudden, severe pain or cramps that are so bad that you cannot walk or talk  · You have a fast heartbeat, shortness of breath, and you feel lightheaded or faint  · You have vaginal bleeding or discharge       · You have nausea, vomiting, fever, and severe pain on your right side  Acute Nausea and Vomiting   WHAT YOU NEED TO KNOW:   Acute nausea and vomiting start suddenly, worsen quickly, and last a short time  DISCHARGE INSTRUCTIONS:   Seek care immediately if:   You see blood in your vomit or your bowel movements  You have sudden, severe pain in your chest and upper abdomen after hard vomiting or retching  You have swelling in your neck and chest      You are dizzy, cold, and thirsty and your eyes and mouth are dry  You are urinating very little or not at all  You have muscle weakness, leg cramps, and trouble breathing  Your heart is beating much faster than normal      You continue to vomit for more than 48 hours  Contact your healthcare provider if:   You have frequent dry heaves (vomiting but nothing comes out)  Your nausea and vomiting does not get better or go away after you use medicine  You have questions or concerns about your condition or treatment  Medicines: You may need any of the following:  Medicines  may be given to calm your stomach and stop your vomiting  You may also need medicines to help you feel more relaxed or to stop nausea and vomiting caused by motion sickness  Gastrointestinal stimulants  are used to help empty your stomach and bowels  This may help decrease nausea and vomiting  Take your medicine as directed  Contact your healthcare provider if you think your medicine is not helping or if you have side effects  Tell him or her if you are allergic to any medicine  Keep a list of the medicines, vitamins, and herbs you take  Include the amounts, and when and why you take them  Bring the list or the pill bottles to follow-up visits  Carry your medicine list with you in case of an emergency  Prevent or manage acute nausea and vomiting:   Do not drink alcohol  Alcohol may upset or irritate your stomach  Too much alcohol can also cause acute nausea and vomiting  Control stress    Headaches due to stress may cause nausea and vomiting  Find ways to relax and manage your stress  Get more rest and sleep  Drink more liquids as directed  Vomiting can lead to dehydration  It is important to drink more liquids to help replace lost body fluids  Ask your healthcare provider how much liquid to drink each day and which liquids are best for you  Your provider may recommend that you drink an oral rehydration solution (ORS)  ORS contains water, salts, and sugar that are needed to replace the lost body fluids  Ask what kind of ORS to use, how much to drink, and where to get it  Eat smaller meals, more often  Eat small amounts of food every 2 to 3 hours, even if you are not hungry  Food in your stomach may decrease your nausea  Talk to your healthcare provider before you take over-the-counter (OTC) medicines  These medicines can cause serious problems if you use certain other medicines, or you have a medical condition  You may have problems if you use too much or use them for longer than the label says  Follow directions on the label carefully  Follow up with your healthcare provider as directed:  Write down your questions so you remember to ask them during your visits  © 2017 [address] [facility] Information is for End User's use only and may not be sold, redistributed or otherwise used for commercial purposes  All illustrations and images included in CareNotes® are the copyrighted property of [employer]  or Zhou Thompson  The above information is an  only  It is not intended as medical advice for individual conditions or treatments  Talk to your doctor, nurse or pharmacist before following any medical regimen to see if it is safe and effective for you  ·   Pregnancy   WHAT YOU NEED TO KNOW:   A normal pregnancy lasts about 40 weeks  The first trimester lasts from your last period through the 12th week of pregnancy   The second trimester lasts from the 13th week of your pregnancy through the 23rd week  The third trimester lasts from your 24th week of pregnancy until your baby is born  If you know the date of your last period, your healthcare provider can estimate your due date  You may give birth to your baby any time from 37 weeks to 2 weeks after your due date  DISCHARGE INSTRUCTIONS:   Seek care immediately if:   You develop a severe headache that does not go away  You have new or increased vision changes, such as blurred or spotted vision  You have new or increased swelling in your face or hands  You have pain or cramping in your abdomen or low back  You have vaginal bleeding  Contact your healthcare provider or obstetrician if:   You have abdominal cramps, pressure, or tightening  You have a change in vaginal discharge  You cannot keep food or drinks down, and you are losing weight  You have chills or a fever  You have vaginal itching, burning, or pain  You have yellow, green, white, or foul-smelling vaginal discharge  You have pain or burning when you urinate, less urine than usual, or pink or bloody urine  You have questions or concerns about your condition or care  Medicines:   Prenatal vitamins  provide some of the extra vitamins and minerals you need during pregnancy  Prenatal vitamins may also help to decrease the risk of certain birth defects  Take your medicine as directed  Contact your healthcare provider if you think your medicine is not helping or if you have side effects  Tell him or her if you are allergic to any medicine  Keep a list of the medicines, vitamins, and herbs you take  Include the amounts, and when and why you take them  Bring the list or the pill bottles to follow-up visits  Carry your medicine list with you in case of an emergency  Follow up with your healthcare provider or obstetrician as directed:  Go to all of your prenatal visits during your pregnancy   Write down your questions so you remember to ask them during your visits  Body changes that may occur during your pregnancy:   Breast changes  you will experience include tenderness and tingling during the early part of your pregnancy  Your breasts will become larger  You may need to use a support bra  You may see a thin, yellow fluid, called colostrum, leak from your nipples during the second trimester  Colostrum is a liquid that changes to milk about 3 days after you give birth  Skin changes and stretch marks  may occur during your pregnancy  You may have red marks, called stretch marks, on your skin  Stretch marks will usually fade after pregnancy  Use lotion if your skin is dry and itchy  The skin on your face, around your nipples, and below your belly button may darken  Most of the time, your skin will return to its normal color after your baby is born  Morning sickness  is nausea and vomiting that can happen at any time of day  Avoid fatty and spicy foods  Eat small meals throughout the day instead of large meals  Desi may help to decrease nausea  Ask your healthcare provider about other ways of decreasing nausea and vomiting  Heartburn  may be caused by changes in your hormones during pregnancy  Your growing uterus may also push your stomach upward and force stomach acid to back up into your esophagus  Eat 4 or 5 small meals each day instead of large meals  Avoid spicy foods  Avoid eating right before bedtime  Constipation  may develop during your pregnancy  To treat constipation, eat foods high in fiber such as fiber cereals, beans, fruits, vegetables, whole-grain breads, and prune juice  Get regular exercise and drink plenty of water  Your healthcare provider may also suggest a fiber supplement to soften your bowel movements  Talk to your healthcare provider before you use any medicines to decrease constipation  Hemorrhoids  are enlarged veins in the rectal area  They may cause pain, itching, and bright red bleeding from your rectum   To decrease your risk of hemorrhoids, prevent constipation and do not strain to have a bowel movement  If you have hemorrhoids, soak in a tub of warm water to ease discomfort  Ask your healthcare provider how you can treat hemorrhoids  Leg cramps and swelling  may be caused by low calcium levels or the added weight of pregnancy  Raise your legs above the level of your heart to decrease swelling  During a leg cramp, stretch or massage the muscle that has the cramp  Heat may help decrease pain and muscle spasms  Apply heat on your muscle for 20 to 30 minutes every 2 hours for as many days as directed  Back pain  may occur as your baby grows  Do not stand for long periods of time or lift heavy items  Use good posture while you stand, squat, or bend  Wear low-heeled shoes with good support  Rest may also help to relieve back pain  Ask your healthcare provider about exercises you can do to strengthen your back muscles  Stay healthy during your pregnancy:   Eat a variety of healthy foods  Healthy foods include fruits, vegetables, whole-grain breads, low-fat dairy foods, beans, lean meats, and fish  Drink liquids as directed  Ask how much liquid to drink each day and which liquids are best for you  Limit caffeine to less than 200 milligrams each day  Limit your intake of fish to 2 servings each week  Choose fish low in mercury such as canned light tuna, shrimp, crab, salmon, cod, or tilapia  Do not  eat fish high in mercury such as swordfish, tilefish, indira mackerel, and shark  Take prenatal vitamins as directed  Your need for certain vitamins and minerals, such as folic acid, increases during pregnancy  Prenatal vitamins provide some of the extra vitamins and minerals you need  Prenatal vitamins may also help to decrease the risk of certain birth defects  Ask how much weight you should gain during your pregnancy  Too much or too little weight gain can be unhealthy for you and your baby       Talk to your healthcare provider about exercise  Moderate exercise can help you stay fit  Your healthcare provider will help you plan an exercise program that is safe for you during pregnancy  Do not smoke  If you smoke, it is never too late to quit  Smoking increases your risk of a miscarriage and other health problems during your pregnancy  Smoking can cause your baby to be born too early or weigh less at birth  Ask your healthcare provider for information if you need help quitting  Do not drink alcohol  Alcohol passes from your body to your baby through the placenta  It can affect your baby's brain development and cause fetal alcohol syndrome (FAS)  FAS is a group of conditions that causes mental, behavior, and growth problems  Talk to your healthcare provider before you take any medicines  Many medicines may harm your baby if you take them when you are pregnant  Do not take any medicines, vitamins, herbs, or supplements without first talking to your healthcare provider  Never use illegal or street drugs (such as marijuana or cocaine) while you are pregnant  Safety tips:   Avoid hot tubs and saunas  Do not use a hot tub or sauna while you are pregnant, especially during your first trimester  Hot tubs and saunas may raise your baby's temperature and increase the risk of birth defects  Avoid toxoplasmosis  This is an infection caused by eating raw meat or being around infected cat feces  It can cause birth defects, miscarriages, and other problems  Wash your hands after you touch raw meat  Make sure any meat is well-cooked before you eat it  Avoid raw eggs and unpasteurized milk  Use gloves or ask someone else to clean your cat's litter box while you are pregnant  Ask your healthcare provider about travel  The most comfortable time to travel is during the second trimester  Ask your healthcare provider if you can travel after 36 weeks  You may not be able to travel in an airplane after 36 weeks   He may also recommend that you avoid long road trips  © 2017 [address] [employer] Information is for End User's use only and may not be sold, redistributed or otherwise used for commercial purposes  All illustrations and images included in CareNotes® are the copyrighted property of [employer]  or [employer]  The above information is an  only  It is not intended as medical advice for individual conditions or treatments  Talk to your doctor, nurse or pharmacist before following any medical regimen to see if it is safe and effective for you  ·   © 2017 [address] [employer] Information is for End User's use only and may not be sold, redistributed or otherwise used for commercial purposes  All illustrations and images included in CareNotes® are the copyrighted property of [employer]  or [employer] Thompson  The above information is an  only  It is not intended as medical advice for individual conditions or treatments  Talk to your doctor, nurse or pharmacist before following any medical regimen to see if it is safe and effective for you

## 2020-01-30 ENCOUNTER — APPOINTMENT (OUTPATIENT)
Dept: LAB | Facility: MEDICAL CENTER | Age: 30
End: 2020-01-30
Payer: COMMERCIAL

## 2020-01-30 ENCOUNTER — OFFICE VISIT (OUTPATIENT)
Dept: FAMILY MEDICINE CLINIC | Facility: CLINIC | Age: 30
End: 2020-01-30
Payer: COMMERCIAL

## 2020-01-30 VITALS
WEIGHT: 149 LBS | HEIGHT: 61 IN | OXYGEN SATURATION: 99 % | BODY MASS INDEX: 28.13 KG/M2 | DIASTOLIC BLOOD PRESSURE: 84 MMHG | HEART RATE: 86 BPM | SYSTOLIC BLOOD PRESSURE: 124 MMHG

## 2020-01-30 DIAGNOSIS — Z00.00 ANNUAL PHYSICAL EXAM: ICD-10-CM

## 2020-01-30 DIAGNOSIS — Z23 NEED FOR INFLUENZA VACCINATION: ICD-10-CM

## 2020-01-30 DIAGNOSIS — Z13.220 SCREENING FOR HYPERLIPIDEMIA: ICD-10-CM

## 2020-01-30 DIAGNOSIS — Z00.00 ANNUAL PHYSICAL EXAM: Primary | ICD-10-CM

## 2020-01-30 LAB
ALBUMIN SERPL BCP-MCNC: 3.5 G/DL (ref 3.5–5)
ALP SERPL-CCNC: 84 U/L (ref 46–116)
ALT SERPL W P-5'-P-CCNC: 16 U/L (ref 12–78)
ANION GAP SERPL CALCULATED.3IONS-SCNC: 4 MMOL/L (ref 4–13)
AST SERPL W P-5'-P-CCNC: 10 U/L (ref 5–45)
BASOPHILS # BLD AUTO: 0.05 THOUSANDS/ΜL (ref 0–0.1)
BASOPHILS NFR BLD AUTO: 1 % (ref 0–1)
BILIRUB SERPL-MCNC: 0.23 MG/DL (ref 0.2–1)
BUN SERPL-MCNC: 17 MG/DL (ref 5–25)
CALCIUM SERPL-MCNC: 8.9 MG/DL (ref 8.3–10.1)
CHLORIDE SERPL-SCNC: 109 MMOL/L (ref 100–108)
CHOLEST SERPL-MCNC: 138 MG/DL (ref 50–200)
CO2 SERPL-SCNC: 27 MMOL/L (ref 21–32)
CREAT SERPL-MCNC: 0.56 MG/DL (ref 0.6–1.3)
EOSINOPHIL # BLD AUTO: 0.03 THOUSAND/ΜL (ref 0–0.61)
EOSINOPHIL NFR BLD AUTO: 0 % (ref 0–6)
ERYTHROCYTE [DISTWIDTH] IN BLOOD BY AUTOMATED COUNT: 12.4 % (ref 11.6–15.1)
GFR SERPL CREATININE-BSD FRML MDRD: 128 ML/MIN/1.73SQ M
GLUCOSE P FAST SERPL-MCNC: 88 MG/DL (ref 65–99)
HCT VFR BLD AUTO: 34 % (ref 34.8–46.1)
HDLC SERPL-MCNC: 50 MG/DL
HGB BLD-MCNC: 10.6 G/DL (ref 11.5–15.4)
IMM GRANULOCYTES # BLD AUTO: 0.04 THOUSAND/UL (ref 0–0.2)
IMM GRANULOCYTES NFR BLD AUTO: 1 % (ref 0–2)
LDLC SERPL CALC-MCNC: 75 MG/DL (ref 0–100)
LYMPHOCYTES # BLD AUTO: 2.04 THOUSANDS/ΜL (ref 0.6–4.47)
LYMPHOCYTES NFR BLD AUTO: 27 % (ref 14–44)
MCH RBC QN AUTO: 26.5 PG (ref 26.8–34.3)
MCHC RBC AUTO-ENTMCNC: 31.2 G/DL (ref 31.4–37.4)
MCV RBC AUTO: 85 FL (ref 82–98)
MONOCYTES # BLD AUTO: 0.58 THOUSAND/ΜL (ref 0.17–1.22)
MONOCYTES NFR BLD AUTO: 8 % (ref 4–12)
NEUTROPHILS # BLD AUTO: 4.75 THOUSANDS/ΜL (ref 1.85–7.62)
NEUTS SEG NFR BLD AUTO: 63 % (ref 43–75)
NONHDLC SERPL-MCNC: 88 MG/DL
NRBC BLD AUTO-RTO: 0 /100 WBCS
PLATELET # BLD AUTO: 268 THOUSANDS/UL (ref 149–390)
PMV BLD AUTO: 11 FL (ref 8.9–12.7)
POTASSIUM SERPL-SCNC: 3.9 MMOL/L (ref 3.5–5.3)
PROT SERPL-MCNC: 6.7 G/DL (ref 6.4–8.2)
RBC # BLD AUTO: 4 MILLION/UL (ref 3.81–5.12)
SODIUM SERPL-SCNC: 140 MMOL/L (ref 136–145)
TRIGL SERPL-MCNC: 66 MG/DL
WBC # BLD AUTO: 7.49 THOUSAND/UL (ref 4.31–10.16)

## 2020-01-30 PROCEDURE — 90471 IMMUNIZATION ADMIN: CPT | Performed by: PHYSICIAN ASSISTANT

## 2020-01-30 PROCEDURE — 90686 IIV4 VACC NO PRSV 0.5 ML IM: CPT | Performed by: PHYSICIAN ASSISTANT

## 2020-01-30 PROCEDURE — 80053 COMPREHEN METABOLIC PANEL: CPT

## 2020-01-30 PROCEDURE — 85025 COMPLETE CBC W/AUTO DIFF WBC: CPT

## 2020-01-30 PROCEDURE — 36415 COLL VENOUS BLD VENIPUNCTURE: CPT

## 2020-01-30 PROCEDURE — 99385 PREV VISIT NEW AGE 18-39: CPT | Performed by: PHYSICIAN ASSISTANT

## 2020-01-30 PROCEDURE — 80061 LIPID PANEL: CPT

## 2020-01-30 NOTE — PROGRESS NOTES
[facility]    NAME: Yonis Glaser  AGE: 34 y o  SEX: female  : 1990     DATE: 2020     Assessment and Plan:     Problem List Items Addressed This Visit     None      Visit Diagnoses     Annual physical exam    -  Primary    BMI 28 0-28 9,adult            Immunizations and preventive care screenings were discussed with patient today  Appropriate education was printed on patient's after visit summary  Counseling:  Dental Health: discussed importance of regular tooth brushing, flossing, and dental visits  Sexual health: discussed sexually transmitted diseases, partner selection, use of condoms, avoidance of unintended pregnancy, and contraceptive alternatives  · Exercise: the importance of regular exercise/physical activity was discussed  Recommend exercise 3-5 times per week for at least 30 minutes  Gave patient information on new dental clinic so that she can get up to date on her dental exams  Routine labs ordered  Flu shot given today  She will return on Monday for her PPD  Return in 1 year (on 2021)  Chief Complaint:     Chief Complaint   Patient presents with   [address] [place] [address] Check     Physical for work      History of Present Illness:     Adult Annual Physical   Patient here for a comprehensive physical exam  The patient reports no problems  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 as well  She does not take any medications or have any chronic problems  She is interested in getting up to date with her routine screening labs  She has an appointment scheduled next week with gynecology  She is up to date with eye exams  She is looking for a new dentist      Diet and Physical Activity  · Diet/Nutrition: well balanced diet and limited junk food  · Exercise: moderate cardiovascular exercise        Depression Screening  PHQ-9 Depression Screening    PHQ-9: Frequency of the following problems over the past two weeks:       Little interest or pleasure in doing things:  0 - not at all  Feeling down, depressed, or hopeless:  0 - not at all  PHQ-2 Score:  0       General Health  · Sleep: sleeps well  · Hearing: normal - bilateral   · Vision: no vision problems and goes for regular eye exams  · Dental: no dental visits for >1 year  /GYN Health  · Last menstrual period: Approximately 2 weeks ago  · Has an appointment scheduled next week with gynecology      Review of Systems:     Review of Systems   Constitutional: Negative for chills, diaphoresis, fatigue and fever  HENT: Negative for congestion, ear pain, postnasal drip, rhinorrhea, sneezing, sore throat and trouble swallowing  Eyes: Negative for pain and visual disturbance  Respiratory: Negative for apnea, cough, shortness of breath and wheezing  Cardiovascular: Negative for chest pain and palpitations  Gastrointestinal: Negative for abdominal pain, constipation, diarrhea, nausea and vomiting  Genitourinary: Negative for dysuria and hematuria  Musculoskeletal: Negative for arthralgias, gait problem and myalgias  Neurological: Negative for dizziness, syncope, weakness, light-headedness, numbness and headaches  Psychiatric/Behavioral: Negative for suicidal ideas  The patient is not nervous/anxious  Past Medical History:     History reviewed  No pertinent past medical history  Past Surgical History:     History reviewed  No pertinent surgical history     Social History:     Social History     Socioeconomic History    Marital status: Single     Spouse name: None    Number of children: None    Years of education: None    Highest education level: None   Occupational History    None   Social Needs    Financial resource strain: None    Food insecurity:     Worry: None     Inability: None    Transportation needs:     Medical: None     Non-medical: None   Tobacco Use    Smoking status: Never Smoker    Smokeless tobacco: Never Used   Substance and Sexual Activity    Alcohol use: No    Drug use: No    Sexual activity: None   Lifestyle    Physical activity:     Days per week: None     Minutes per session: None    Stress: None   Relationships    Social connections:     Talks on phone: None     Gets together: None     Attends Scientology service: None     Active member of club or organization: None     Attends meetings of clubs or organizations: None     Relationship status: None    Intimate partner violence:     Fear of current or ex partner: None     Emotionally abused: None     Physically abused: None     Forced sexual activity: None   Other Topics Concern    None   Social History Narrative    None      Family History:     Family History   Problem Relation Age of Onset    Asthma Mother     Heart attack Father     Diabetes Maternal Grandmother       Current Medications:     No current outpatient medications on file  No current facility-administered medications for this visit  Allergies:     No Known Allergies   Physical Exam:     /84 (BP Location: Left arm, Patient Position: Sitting)   Pulse 86   Ht 5' 1" (1 549 m)   Wt 67 6 kg (149 lb)   SpO2 99%   BMI 28 15 kg/m²     Physical Exam   Constitutional: She is oriented to person, place, and time  She appears well-developed and well-nourished  HENT:   Head: Normocephalic and atraumatic  Right Ear: Tympanic membrane, external ear and ear canal normal    Left Ear: Tympanic membrane, external ear and ear canal normal    Nose: Nose normal    Mouth/Throat: Oropharynx is clear and moist and mucous membranes are normal  No oropharyngeal exudate, posterior oropharyngeal edema or posterior oropharyngeal erythema  Eyes: Pupils are equal, round, and reactive to light  EOM are normal    Neck: Normal range of motion  Neck supple  Cardiovascular: Normal rate, regular rhythm and normal heart sounds   Exam reveals no gallop and no friction rub  No murmur heard  Pulmonary/Chest: Effort normal and breath sounds normal  No respiratory distress  She has no wheezes  She has no rales  Abdominal: Soft  Bowel sounds are normal  There is no tenderness  There is no rebound and no guarding  Musculoskeletal: Normal range of motion  Lymphadenopathy:     She has no cervical adenopathy  Neurological: She is alert and oriented to person, place, and time  Skin: Skin is warm and dry  Psychiatric: She has a normal mood and affect  Her behavior is normal  Judgment and thought content normal    Vitals reviewed  Johny Barton PA-C   [facility]    BMI Counseling: Body mass index is 28 15 kg/m²  The BMI is above normal  Nutrition recommendations include decreasing overall calorie intake and 3-5 servings of fruits/vegetables daily  Exercise recommendations include exercising 3-5 times per week

## 2020-01-30 NOTE — PATIENT INSTRUCTIONS

## 2020-02-04 ENCOUNTER — TRANSCRIBE ORDERS (OUTPATIENT)
Dept: ADMINISTRATIVE | Facility: HOSPITAL | Age: 30
End: 2020-02-04

## 2020-02-04 ENCOUNTER — LAB (OUTPATIENT)
Dept: LAB | Facility: MEDICAL CENTER | Age: 30
End: 2020-02-04
Payer: COMMERCIAL

## 2020-02-04 ENCOUNTER — OFFICE VISIT (OUTPATIENT)
Dept: OBGYN CLINIC | Facility: MEDICAL CENTER | Age: 30
End: 2020-02-04
Payer: COMMERCIAL

## 2020-02-04 VITALS
SYSTOLIC BLOOD PRESSURE: 118 MMHG | WEIGHT: 145.3 LBS | BODY MASS INDEX: 27.43 KG/M2 | DIASTOLIC BLOOD PRESSURE: 78 MMHG | HEIGHT: 61 IN

## 2020-02-04 DIAGNOSIS — Z11.3 SCREENING FOR STD (SEXUALLY TRANSMITTED DISEASE): Primary | ICD-10-CM

## 2020-02-04 DIAGNOSIS — Z11.3 SCREENING FOR STDS (SEXUALLY TRANSMITTED DISEASES): ICD-10-CM

## 2020-02-04 DIAGNOSIS — R10.2 PELVIC PAIN IN FEMALE: ICD-10-CM

## 2020-02-04 DIAGNOSIS — Z11.3 SCREENING FOR STD (SEXUALLY TRANSMITTED DISEASE): ICD-10-CM

## 2020-02-04 DIAGNOSIS — Z01.419 ENCOUNTER FOR ROUTINE GYNECOLOGICAL EXAMINATION WITH PAPANICOLAOU SMEAR OF CERVIX: Primary | ICD-10-CM

## 2020-02-04 LAB
HAV IGM SER QL: NORMAL
HBV CORE IGM SER QL: NORMAL
HBV SURFACE AG SER QL: NORMAL
HCV AB SER QL: NORMAL

## 2020-02-04 PROCEDURE — G0145 SCR C/V CYTO,THINLAYER,RESCR: HCPCS | Performed by: OBSTETRICS & GYNECOLOGY

## 2020-02-04 PROCEDURE — 86695 HERPES SIMPLEX TYPE 1 TEST: CPT

## 2020-02-04 PROCEDURE — 87491 CHLMYD TRACH DNA AMP PROBE: CPT | Performed by: OBSTETRICS & GYNECOLOGY

## 2020-02-04 PROCEDURE — 86592 SYPHILIS TEST NON-TREP QUAL: CPT

## 2020-02-04 PROCEDURE — 36415 COLL VENOUS BLD VENIPUNCTURE: CPT

## 2020-02-04 PROCEDURE — 80074 ACUTE HEPATITIS PANEL: CPT

## 2020-02-04 PROCEDURE — 86696 HERPES SIMPLEX TYPE 2 TEST: CPT

## 2020-02-04 PROCEDURE — 87591 N.GONORRHOEAE DNA AMP PROB: CPT | Performed by: OBSTETRICS & GYNECOLOGY

## 2020-02-04 PROCEDURE — 87389 HIV-1 AG W/HIV-1&-2 AB AG IA: CPT

## 2020-02-04 PROCEDURE — 99395 PREV VISIT EST AGE 18-39: CPT | Performed by: OBSTETRICS & GYNECOLOGY

## 2020-02-04 PROCEDURE — 3008F BODY MASS INDEX DOCD: CPT | Performed by: OBSTETRICS & GYNECOLOGY

## 2020-02-04 RX ORDER — VITAMIN B COMPLEX
1000 TABLET ORAL DAILY
COMMUNITY

## 2020-02-04 RX ORDER — FERROUS SULFATE 325(65) MG
325 TABLET ORAL
COMMUNITY

## 2020-02-04 NOTE — PATIENT INSTRUCTIONS
Etonogestrel (Implant)   Etonogestrel (e-toe-sherri-ADRIENNE-trel)  Prevents pregnancy  Brand Name(s): Nexplanon   There may be other brand names for this medicine  When This Medicine Should Not Be Used: This medicine is not right for everyone  You should not receive it if you had an allergic reaction to etonogestrel, or if you are pregnant  Do not use it if you have breast cancer, heart disease, liver disease, or a history of blood clots (such as deep vein thrombosis, pulmonary embolism, or stroke)  How to Use This Medicine:   Implant  · A nurse or other trained health professional will give you this medicine  · This medicine is an implant  It will be surgically placed under the skin of your upper, inner arm  · Gently press your fingertips over the skin where this medicine was inserted  You should be able to feel the implant  · You might have to use another form of birth control for 7 days after the implant is inserted  Your doctor will tell you if this is needed  · Your doctor can remove the implant at any time if you want to stop using this medicine  The implant must be removed after 3 years, but you may have a new one inserted if you still want to use this form of birth control  · Read and follow the patient instructions that come with this medicine  Talk to your doctor or pharmacist if you have any questions  Drugs and Foods to Avoid:   Ask your doctor or pharmacist before using any other medicine, including over-the-counter medicines, vitamins, and herbal products  · Some foods and medicines can affect how etonogestrel works  Tell your doctor if you are using any of the following:  ¨ Bosentan, carbamazepine, cyclosporine, felbamate, griseofulvin, itraconazole, ketoconazole, lamotrigine, oxcarbazepine, phenobarbital, phenytoin, rifampin, Corina wort, topiramate  ¨ Medicine for HIV/AIDS  Warnings While Using This Medicine:   · Tell your doctor right away if you think you become pregnant   The implant will need to be removed  · Tell your doctor if you have cancer, blood circulation problems, high blood pressure, or kidney disease, or if you smoke  Tell your doctor if you are breastfeeding, or if you have recently given birth  Also tell your doctor if you have diabetes or prediabetes, high cholesterol, or a history of depression  · This medicine may cause the following problems:  ¨ Ectopic (tubal) pregnancy  ¨ Cysts in the ovaries  ¨ Possible risk of breast cancer  ¨ Higher risk of heart attack, stroke, or blood clots  ¨ Liver cancers or tumors  ¨ High blood pressure  · This medicine may change your usual menstrual cycle  You might have irregular bleeding, or your periods may be lighter, shorter, heavier, or longer  You might not have a period in some cycles  However, call your doctor if you think you are pregnant or if you have severe pain or changes that worry you  · This medicine will not protect you from HIV/AIDS or other sexually transmitted diseases  · You might need to have the implant removed if you will be inactive for a period of time, such as after major surgery, because of the risk of blood clots  · Tell any doctor or dentist who treats you that you are using this medicine  This medicine may affect certain medical test results  · Your doctor will check your progress and the effects of this medicine at regular visits  Keep all appointments  · Keep all medicine out of the reach of children  Never share your medicine with anyone    Possible Side Effects While Using This Medicine:   Call your doctor right away if you notice any of these side effects:  · Allergic reaction: Itching or hives, swelling in your face or hands, swelling or tingling in your mouth or throat, chest tightness, trouble breathing  · Chest pain, trouble breathing, or coughing up blood  · Dark urine or pale stools, nausea, vomiting, loss of appetite, stomach pain, yellow skin or eyes  · Double vision or other trouble seeing  · Numbness or weakness on one side of your body, sudden or severe headache, problems with vision, speech, or walking  · Pain in your lower leg (calf)  · Severe or ongoing pain, tingling, bleeding, bruising, redness, itching, or swelling where the implant is placed  · Unusual or severe pain in your abdomen  · Unusual or unexpected vaginal bleeding or heavy bleeding  If you notice these less serious side effects, talk with your doctor:   · Acne or pimples  · Mild headache  · Mild pain, tingling, bleeding, bruising, redness, itching, or swelling where the implant is placed  · Mood changes  · Weight gain  If you notice other side effects that you think are caused by this medicine, tell your doctor  Call your doctor for medical advice about side effects  You may report side effects to FDA at 2-831-FDA-8896  © 2017 [address] Information is for End User's use only and may not be sold, redistributed or otherwise used for commercial purposes  The above information is an  only  It is not intended as medical advice for individual conditions or treatments  Talk to your doctor, nurse or pharmacist before following any medical regimen to see if it is safe and effective for you  Thank you for your confidence in our team    We appreciate you and welcome your feedback  If you receive a survey from us, please take a few moments to let us know how we are doing     Sincerely,   Ede Gómez MD

## 2020-02-04 NOTE — PROGRESS NOTES
ASSESSMENT & PLAN: Diagnoses and all orders for this visit:    Encounter for routine gynecological examination with Papanicolaou smear of cervix  -     Liquid-based pap, screening    Pelvic pain in female  -     US pelvis complete non OB; Future    Screening for STDs (sexually transmitted diseases)  -     Chlamydia/GC amplified DNA by PCR  -     HIV 1/2 AG-AB combo; Future  -     RPR; Future  -     Hepatitis panel, acute; Future  -     Herpes I/II IgG TOBY w Reflex to HSV-2; Future  -     Herpes I /II IgM Ab Indriect; Future    Other orders  -     cholecalciferol (VITAMIN D3) 25 mcg (1,000 units) tablet; Take 1,000 Units by mouth daily  -     ferrous sulfate 325 (65 Fe) mg tablet; Take 325 mg by mouth daily with breakfast         1  Routine well woman exam done today  2  Pap:  The patient's pap is not up to date  Pap was done today  Current ASCCP Guidelines reviewed  3   STD testing was done  Patient will have gonorrhea chlamydia testing with her Pap  She was given a lab slip for HIV, RPR hepatitis and herpes  4   Patient has had her Gardasil vaccination  Recommendations reviewed  5  The following were reviewed in today's visit: adequate intake of calcium and vitamin D, exercise and healthy diet  6  F/u in 1 year for next routine gyn exam   7  Patient given pamphlet on Nexplanon  We will start preauthorization for it  8  Patient given referral slip for pelvic ultrasound to evaluate her pelvic pain and dyspareunia  CC:  Annual Gynecologic Examination    HPI: Michelle Prado is a 34 y o   who presents for annual gynecologic examination  She has the following concerns:  Recently stopped birth control pills  Wants to discuss nexplanon  Pt reports weight gain with the Mirena and felt it with intercourse  Patient also reports she has been having some pelvic pain  She says it is using on the sides and can alternate  Also reports dyspareunia      Health Maintenance:    Patient describes her health as excellent  The last health maintenance visit was 3 years ago  Patient does have weight concerns  She is starting to exercise with walking and jumping jacks  She does wear her seatbelt routinely  She does perform regular monthly self breast exams  She does feels safe at home  Patients does follow a healthy diet  Patients gets 2 servings of dairy or calcium rich foods a day  Last pap: 3 years ago     There is no problem list on file for this patient  History reviewed  No pertinent past medical history  History reviewed  No pertinent surgical history  Past OB/Gyn History:  Pt does not have menstrual issues  Menses are off by a few days, lasting 3 days  History of sexually transmitted infection: No   History of abnormal pap smears: No     Patient is currently sexually active  heterosexual  The current method of family planning is none      Family History   Problem Relation Age of Onset    Asthma Mother     Heart attack Father     Diabetes Maternal Grandmother        Social History:  Social History     Socioeconomic History    Marital status: Single     Spouse name: Not on file    Number of children: Not on file    Years of education: Not on file    Highest education level: Not on file   Occupational History    Not on file   Social Needs    Financial resource strain: Not on file    Food insecurity:     Worry: Not on file     Inability: Not on file    Transportation needs:     Medical: Not on file     Non-medical: Not on file   Tobacco Use    Smoking status: Never Smoker    Smokeless tobacco: Never Used   Substance and Sexual Activity    Alcohol use: No    Drug use: No    Sexual activity: Yes     Partners: Male     Birth control/protection: None   Lifestyle    Physical activity:     Days per week: Not on file     Minutes per session: Not on file    Stress: Not on file   Relationships    Social connections:     Talks on phone: Not on file     Gets together: Not on file Attends Scientology service: Not on file     Active member of club or organization: Not on file     Attends meetings of clubs or organizations: Not on file     Relationship status: Not on file    Intimate partner violence:     Fear of current or ex partner: Not on file     Emotionally abused: Not on file     Physically abused: Not on file     Forced sexual activity: Not on file   Other Topics Concern    Not on file   Social History Narrative    Not on file     Presently lives with sons age 6and 35 years old  Patient is currently employed homemaker  No Known Allergies      Current Outpatient Medications:     cholecalciferol (VITAMIN D3) 25 mcg (1,000 units) tablet, Take 1,000 Units by mouth daily, Disp: , Rfl:     ferrous sulfate 325 (65 Fe) mg tablet, Take 325 mg by mouth daily with breakfast, Disp: , Rfl:       Review of Systems  Constitutional :no fever, feels well, no tiredness, no recent weight gain or loss  ENT: no ear ache, no loss of hearing, no nosebleeds or nasal discharge, no sore throat or hoarseness  Cardiovascular: no complaints of slow or fast heart beat, no chest pain, no palpitations, no leg claudication or lower extremity edema  Respiratory: no complaints of shortness of shortness of breath, no MACIEL  Breasts:no complaints of breast pain, breast lump, or nipple discharge  Gastrointestinal: no complaints of abdominal pain, constipation, nausea, vomiting, or diarrhea or bloody stools  Genitourinary : no complaints of dysuria, incontinence, +pelvic pain, no dysmenorrhea, vaginal discharge or abnormal vaginal bleeding and as noted in HPI  Musculoskeletal: no complaints of arthralgia, no myalgia, no joint swelling or stiffness, no limb pain or swelling    Integumentary: no complaints of skin rash or lesion, itching or dry skin  Neurological: no complaints of headache, no confusion, no numbness or tingling, no dizziness or fainting      Physical Exam:   /78   Ht 5' 1" (1 549 m)   Wt 65 9 kg (145 lb 4 8 oz)   LMP 01/18/2020 (Exact Date)   BMI 27 45 kg/m²     General: appears stated age, cooperative, alert normal mood and affect   Psychiatric oriented to person, place and time  Mood and affect normal   Neck: normal, supple,trachea midline, no masses  Thyroid: normal, no thyromegaly   Heart: regular rate and rhythm, S1, S2 normal, no murmur, click, rub or gallop   Lungs: clear to auscultation bilaterally, no increased work of breathing or signs of respiratory distress   Breasts: normal, no dimpling or skin changes noted, moderate fibrocystic changes bilaterally   Abdomen: soft, non-tender, without masses or organomegaly   Vulva: normal , no lesions   Vagina: normal , no lesions or dryness   Urethra: normal   Urethal meatus normal   Bladder Normal, soft, non-tender and no prolapse or masses appreciated   Cervix: normal, no palpable masses    Uterus: normal , non-tender, not enlarged, no palpable masses   Adnexa: normal, non-tender without fullness or masses on right  Minimal tenderness to palpation on the left adnexa  No fullness or masses noted  Lymphatic Palpation of lymph nodes in neck, axilla, groin and/or other locations: no lymphadenopathy or masses noted   Skin Normal skin turgor and no rashes    Palpation of skin and subcutaneous tissue normal

## 2020-02-05 LAB
C TRACH DNA SPEC QL NAA+PROBE: NEGATIVE
HSV1 IGG SER IA-ACNC: 29.7 INDEX (ref 0–0.9)
HSV2 IGG SER IA-ACNC: <0.91 INDEX (ref 0–0.9)
N GONORRHOEA DNA SPEC QL NAA+PROBE: NEGATIVE
RPR SER QL: NORMAL

## 2020-02-06 ENCOUNTER — TELEPHONE (OUTPATIENT)
Dept: OBGYN CLINIC | Facility: MEDICAL CENTER | Age: 30
End: 2020-02-06

## 2020-02-06 LAB
HIV 1+2 AB+HIV1 P24 AG SERPL QL IA: NORMAL
HSV1 IGM TITR SER IF: NORMAL TITER
HSV2 IGM TITR SER IF: NORMAL TITER

## 2020-02-06 NOTE — TELEPHONE ENCOUNTER
----- Message from [facility] sent at 2/6/2020 11:07 AM EST -----  lmovm for pt to cb  I checked pt's benefits through [employer]  Effective 10/1/19 and active  Pt is covered for insertion, removal and device at 100%  No PA needed  Please schedule appt when pt calls back  ----- Message -----  From: Kathy Maurer MD  Sent: 2/4/2020   2:54 PM EST  To: [facility]    Please preauthorize the Nexplanon

## 2020-02-08 LAB
LAB AP GYN PRIMARY INTERPRETATION: NORMAL
Lab: NORMAL
PATH INTERP SPEC-IMP: NORMAL

## 2020-02-09 ENCOUNTER — HOSPITAL ENCOUNTER (EMERGENCY)
Facility: HOSPITAL | Age: 30
Discharge: HOME | End: 2020-02-09
Attending: EMERGENCY MEDICINE
Payer: COMMERCIAL

## 2020-02-09 VITALS
TEMPERATURE: 97.4 F | DIASTOLIC BLOOD PRESSURE: 72 MMHG | OXYGEN SATURATION: 99 % | WEIGHT: 145 LBS | SYSTOLIC BLOOD PRESSURE: 118 MMHG | HEART RATE: 84 BPM | HEIGHT: 61 IN | BODY MASS INDEX: 27.38 KG/M2 | RESPIRATION RATE: 18 BRPM
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
## 2020-02-09 PROCEDURE — 99283 EMERGENCY DEPT VISIT LOW MDM: CPT | Performed by: EMERGENCY MEDICINE

## 2020-02-09 PROCEDURE — 99283 EMERGENCY DEPT VISIT LOW MDM: CPT

## 2020-02-09 RX ORDER — AMOXICILLIN AND CLAVULANATE POTASSIUM 875; 125 MG/1; MG/1
1 TABLET, FILM COATED ORAL EVERY 12 HOURS
Qty: 10 TABLET | Refills: 0 | Status: SHIPPED | OUTPATIENT
Start: 2020-02-09 | End: 2020-02-14

## 2020-02-09 RX ORDER — AMOXICILLIN AND CLAVULANATE POTASSIUM 875; 125 MG/1; MG/1
1 TABLET, FILM COATED ORAL ONCE
Status: COMPLETED | OUTPATIENT
Start: 2020-02-09 | End: 2020-02-09

## 2020-02-09 RX ADMIN — AMOXICILLIN AND CLAVULANATE POTASSIUM 1 TABLET: 875; 125 TABLET, FILM COATED ORAL at 19:36

## 2020-02-10 DIAGNOSIS — B37.3 VAGINAL YEAST INFECTION: Primary | ICD-10-CM

## 2020-02-10 RX ORDER — FLUCONAZOLE 150 MG/1
150 TABLET ORAL ONCE
Qty: 1 TABLET | Refills: 0 | Status: SHIPPED | OUTPATIENT
Start: 2020-02-10 | End: 2020-02-10

## 2020-02-10 NOTE — RESULT ENCOUNTER NOTE
Please notify patient of normal results of pap and neg GC/Chl   +yeast on pap  Please offer Diflucan 150 mg p o   X1

## 2020-02-12 ENCOUNTER — TELEPHONE (OUTPATIENT)
Dept: FAMILY MEDICINE CLINIC | Facility: CLINIC | Age: 30
End: 2020-02-12

## 2020-02-12 NOTE — TELEPHONE ENCOUNTER
Per patient chart the patient is currently taking Augmentin that was prescribed by the ER for a bite  This has Amoxicillin in in and would cover a dental abscess

## 2020-02-12 NOTE — TELEPHONE ENCOUNTER
Pt was seen in [place] [address]  20    Pt called dental clinic today due to dental pain    Dental clinic asking if PCP would be willing to Rx an antibiotic, incase of dental abscess (usual Rx is Amoxil 500mg Si TID)     Pt is schedule tomorrow in [facility] @ clinic for emergency visit
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
## 2020-02-27 ENCOUNTER — HOSPITAL ENCOUNTER (OUTPATIENT)
Dept: ULTRASOUND IMAGING | Facility: HOSPITAL | Age: 30
Discharge: HOME | End: 2020-02-27
Attending: OBSTETRICS & GYNECOLOGY
Payer: COMMERCIAL

## 2020-02-27 DIAGNOSIS — R10.2 PELVIC PAIN IN FEMALE: ICD-10-CM

## 2020-02-27 PROCEDURE — 76856 US EXAM PELVIC COMPLETE: CPT

## 2020-02-27 PROCEDURE — 76830 TRANSVAGINAL US NON-OB: CPT

## 2020-03-04 ENCOUNTER — PROCEDURE VISIT (OUTPATIENT)
Dept: OBGYN CLINIC | Facility: MEDICAL CENTER | Age: 30
End: 2020-03-04
Payer: COMMERCIAL

## 2020-03-04 VITALS — BODY MASS INDEX: 27.55 KG/M2 | DIASTOLIC BLOOD PRESSURE: 72 MMHG | WEIGHT: 145.8 LBS | SYSTOLIC BLOOD PRESSURE: 116 MMHG

## 2020-03-04 DIAGNOSIS — Z30.017 ENCOUNTER FOR INSERTION SUBDERMAL CONTRACEPTIVE: Primary | ICD-10-CM

## 2020-03-04 LAB — SL AMB POCT URINE HCG: NEGATIVE

## 2020-03-04 PROCEDURE — 81025 URINE PREGNANCY TEST: CPT | Performed by: OBSTETRICS & GYNECOLOGY

## 2020-03-04 PROCEDURE — 11981 INSERTION DRUG DLVR IMPLANT: CPT | Performed by: OBSTETRICS & GYNECOLOGY

## 2020-03-04 NOTE — PATIENT INSTRUCTIONS
Etonogestrel (Implant)   Etonogestrel (e-toe-sherri-ADRIENNE-trel)  Prevents pregnancy  Brand Name(s): Nexplanon   There may be other brand names for this medicine  When This Medicine Should Not Be Used: This medicine is not right for everyone  You should not receive it if you had an allergic reaction to etonogestrel, or if you are pregnant  Do not use it if you have breast cancer, heart disease, liver disease, or a history of blood clots (such as deep vein thrombosis, pulmonary embolism, or stroke)  How to Use This Medicine:   Implant  · A nurse or other trained health professional will give you this medicine  · This medicine is an implant  It will be surgically placed under the skin of your upper, inner arm  · Gently press your fingertips over the skin where this medicine was inserted  You should be able to feel the implant  · You might have to use another form of birth control for 7 days after the implant is inserted  Your doctor will tell you if this is needed  · Your doctor can remove the implant at any time if you want to stop using this medicine  The implant must be removed after 3 years, but you may have a new one inserted if you still want to use this form of birth control  · Read and follow the patient instructions that come with this medicine  Talk to your doctor or pharmacist if you have any questions  Drugs and Foods to Avoid:   Ask your doctor or pharmacist before using any other medicine, including over-the-counter medicines, vitamins, and herbal products  · Some foods and medicines can affect how etonogestrel works  Tell your doctor if you are using any of the following:  ¨ Bosentan, carbamazepine, cyclosporine, felbamate, griseofulvin, itraconazole, ketoconazole, lamotrigine, oxcarbazepine, phenobarbital, phenytoin, rifampin, Corina wort, topiramate  ¨ Medicine for HIV/AIDS  Warnings While Using This Medicine:   · Tell your doctor right away if you think you become pregnant   The implant will need to be removed  · Tell your doctor if you have cancer, blood circulation problems, high blood pressure, or kidney disease, or if you smoke  Tell your doctor if you are breastfeeding, or if you have recently given birth  Also tell your doctor if you have diabetes or prediabetes, high cholesterol, or a history of depression  · This medicine may cause the following problems:  ¨ Ectopic (tubal) pregnancy  ¨ Cysts in the ovaries  ¨ Possible risk of breast cancer  ¨ Higher risk of heart attack, stroke, or blood clots  ¨ Liver cancers or tumors  ¨ High blood pressure  · This medicine may change your usual menstrual cycle  You might have irregular bleeding, or your periods may be lighter, shorter, heavier, or longer  You might not have a period in some cycles  However, call your doctor if you think you are pregnant or if you have severe pain or changes that worry you  · This medicine will not protect you from HIV/AIDS or other sexually transmitted diseases  · You might need to have the implant removed if you will be inactive for a period of time, such as after major surgery, because of the risk of blood clots  · Tell any doctor or dentist who treats you that you are using this medicine  This medicine may affect certain medical test results  · Your doctor will check your progress and the effects of this medicine at regular visits  Keep all appointments  · Keep all medicine out of the reach of children  Never share your medicine with anyone    Possible Side Effects While Using This Medicine:   Call your doctor right away if you notice any of these side effects:  · Allergic reaction: Itching or hives, swelling in your face or hands, swelling or tingling in your mouth or throat, chest tightness, trouble breathing  · Chest pain, trouble breathing, or coughing up blood  · Dark urine or pale stools, nausea, vomiting, loss of appetite, stomach pain, yellow skin or eyes  · Double vision or other trouble seeing  · Numbness or weakness on one side of your body, sudden or severe headache, problems with vision, speech, or walking  · Pain in your lower leg (calf)  · Severe or ongoing pain, tingling, bleeding, bruising, redness, itching, or swelling where the implant is placed  · Unusual or severe pain in your abdomen  · Unusual or unexpected vaginal bleeding or heavy bleeding  If you notice these less serious side effects, talk with your doctor:   · Acne or pimples  · Mild headache  · Mild pain, tingling, bleeding, bruising, redness, itching, or swelling where the implant is placed  · Mood changes  · Weight gain  If you notice other side effects that you think are caused by this medicine, tell your doctor  Call your doctor for medical advice about side effects  You may report side effects to FDA at 0-523-FDA-3471  © 2017 [address] Information is for End User's use only and may not be sold, redistributed or otherwise used for commercial purposes  The above information is an  only  It is not intended as medical advice for individual conditions or treatments  Talk to your doctor, nurse or pharmacist before following any medical regimen to see if it is safe and effective for you  Thank you for your confidence in our team    We appreciate you and welcome your feedback  If you receive a survey from us, please take a few moments to let us know how we are doing     Sincerely,   Ede Gómez MD

## 2020-03-04 NOTE — PROGRESS NOTES
Remove and insert drug implant  Date/Time: 3/4/2020 11:38 AM  Performed by: Kathy Maurer MD  Authorized by: Kathy Maurer MD     Consent:     Consent obtained:  Written    Consent given by:  Patient    Procedural risks discussed:  Bleeding, failure rate, infection and repeat procedure    Patient questions answered: yes      Patient agrees, verbalizes understanding, and wants to proceed: yes      Educational handouts given: yes      Instructions and paperwork completed: yes    Indication:     Indication: Insertion of non-biodegradable drug delivery implant    Pre-procedure:     Pre-procedure timeout performed: yes      Prepped with: povidone-iodine      Local anesthetic:  Lidocaine 1%    The site was cleaned and prepped in a sterile fashion: yes    Procedure:     Procedure: Insertion    Left/right:  Left    Preloaded contraceptive capsule trocar was placed subdermally: yes      Visualization of implant was obtained: yes      Contraceptive capsule was inserted and trocar removed: yes      Palpation confirms placement by provider and patient: yes      Site was closed with steri-strips and pressure bandage applied: yes    Comments:      General wound care instructions reviewed with patient  Patient to notify us with any questions or concerns

## 2020-03-13 ENCOUNTER — HOSPITAL ENCOUNTER (EMERGENCY)
Facility: HOSPITAL | Age: 30
Discharge: HOME | End: 2020-03-13
Attending: EMERGENCY MEDICINE | Admitting: EMERGENCY MEDICINE
Payer: COMMERCIAL

## 2020-03-13 VITALS
HEART RATE: 97 BPM | RESPIRATION RATE: 18 BRPM | WEIGHT: 144.62 LBS | HEIGHT: 61 IN | SYSTOLIC BLOOD PRESSURE: 121 MMHG | OXYGEN SATURATION: 98 % | BODY MASS INDEX: 27.3 KG/M2 | DIASTOLIC BLOOD PRESSURE: 83 MMHG | TEMPERATURE: 98.2 F

## 2020-03-13 DIAGNOSIS — J06.9 URI (UPPER RESPIRATORY INFECTION): ICD-10-CM

## 2020-03-13 DIAGNOSIS — B34.9 VIRAL SYNDROME: Primary | ICD-10-CM

## 2020-03-13 PROCEDURE — 99283 EMERGENCY DEPT VISIT LOW MDM: CPT

## 2020-03-13 PROCEDURE — 99282 EMERGENCY DEPT VISIT SF MDM: CPT | Performed by: EMERGENCY MEDICINE

## 2020-03-13 RX ORDER — FLUTICASONE PROPIONATE 50 MCG
1 SPRAY, SUSPENSION (ML) NASAL 2 TIMES DAILY
Qty: 1 BOTTLE | Refills: 0 | Status: SHIPPED | OUTPATIENT
Start: 2020-03-13 | End: 2020-03-27

## 2020-03-13 NOTE — ED PROVIDER NOTES
History  Chief Complaint   Patient presents with    Flu Symptoms     patient reports cough, runny nose, chill, hot flashes, weakness beginning last night       History provided by:  Patient  Flu Symptoms   Presenting symptoms: cough, fatigue, myalgias, rhinorrhea (Clear copious rhinorrhea bilaterally since yesterday evening) and sore throat    Presenting symptoms: no diarrhea, no fever, no headaches, no nausea, no shortness of breath and no vomiting    Cough:     Cough characteristics: Mild np cough without dyspnea or chest pain  Fatigue:     Severity:  Moderate    Duration:  24 hours    Timing:  Constant    Progression:  Worsening  Rhinorrhea:     Quality:  Clear    Severity:  Moderate    Duration:  22 hours    Timing:  Constant    Progression:  Worsening  Severity:  Mild  Onset quality:  Gradual  Duration: Patient had odynophagia last week resolving prior to current sx; odynophagia then was not a/w fever/chills/cough/rhinorrhea  Progression:  Resolved  Relieved by:  OTC medications (Has taken ibuprofen for sx with mild improvement)  Worsened by:  Nothing  Ineffective treatments:  None tried  Associated symptoms: chills (No objective fever) and nasal congestion    Associated symptoms: no decreased appetite, no decrease in physical activity, no ear pain and no neck stiffness    Risk factors: no immunocompromised state, not pregnant and no sick contacts    Risk factors comment:  No abx use in past 30d  Received influenza vaccine in current season    A/p:  Otherwise healthy and nontoxic 63-year-old woman presenting with mild URI symptoms with very minimal lower respiratory tract symptoms  Has a reassuring examination and normal vital signs  Recommended symptomatic management with antitussive/analgesics  As she does have significant rhinorrhea/nasal congestion, I will prescribe fluticasone nasal spray for the symptoms    If patient has not had expected resolution of symptoms at approximately 5-6 days, she will need re-evaluation with the primary physician  All questions were answered prior to discharge  Patient expressed understanding and agreed to plan  Prior to Admission Medications   Prescriptions Last Dose Informant Patient Reported? Taking?    Prenatal Vit-Fe Fumarate-FA (PRENATAL MULTIVITAMIN) 28-0 8 MG TABS   Yes No   Sig: Take 1 tablet by mouth daily   acetaminophen (TYLENOL) 325 mg tablet   No No   Sig: Take 2 tablets (650 mg total) by mouth every 6 (six) hours as needed for mild pain or headaches   benzocaine-menthol-lanolin-aloe (DERMOPLAST) 20-0 5 % topical spray   No No   Sig: Apply 1 application topically 4 (four) times a day as needed for mild pain   Patient not taking: Reported on 8/15/2018    cholecalciferol (VITAMIN D3) 25 mcg (1,000 units) tablet   Yes No   Sig: Take 1,000 Units by mouth daily   docusate sodium (COLACE) 100 mg capsule   Yes No   Sig: Take 100 mg by mouth 2 (two) times a day   ferrous sulfate 325 (65 Fe) mg tablet   Yes No   Sig: Take 325 mg by mouth daily with breakfast   ibuprofen (MOTRIN) 200 mg tablet   No No   Sig: Take 3 tablets (600 mg total) by mouth every 6 (six) hours as needed for mild pain   Patient not taking: Reported on 3/4/2020   witch hazel-glycerin (TUCKS) topical pad   No No   Sig: Apply 1 pad topically as needed for irritation   Patient not taking: Reported on 8/15/2018       Facility-Administered Medications: None       Past Medical History:   Diagnosis Date    [facility]     misplaced IUD, last assessed 11/19/13       Past Surgical History:   Procedure Laterality Date    NO PAST SURGERIES      OTHER SURGICAL HISTORY      IUD removal, last assessed 12/8/15       Family History   Problem Relation Age of Onset    Asthma Mother     Heart attack Father     Diabetes Maternal Grandmother     Coronary artery disease Father     Diabetes Family     No Known Problems Sister     No Known Problems Brother     No Known Problems Son      I have reviewed and agree with the history as documented  E-Cigarette/Vaping     E-Cigarette/Vaping Substances     Social History     Tobacco Use    Smoking status: Never Smoker    Smokeless tobacco: Never Used   Substance Use Topics    Alcohol use: No    Drug use: No       Review of Systems   Constitutional: Positive for chills (No objective fever) and fatigue  Negative for decreased appetite and fever  HENT: Positive for congestion, rhinorrhea (Clear copious rhinorrhea bilaterally since yesterday evening) and sore throat  Negative for ear pain, postnasal drip, sinus pressure, sinus pain and trouble swallowing  Respiratory: Positive for cough  Negative for shortness of breath  Cardiovascular: Negative for chest pain and palpitations  Gastrointestinal: Negative for abdominal pain, diarrhea, nausea and vomiting  Musculoskeletal: Positive for myalgias  Negative for neck stiffness  Skin: Negative for color change, pallor, rash and wound  Neurological: Negative for dizziness, weakness, numbness and headaches  All other systems reviewed and are negative  Physical Exam  Physical Exam   Constitutional: She is oriented to person, place, and time  She appears well-developed and well-nourished  She is active and cooperative  No distress  HENT:   Head: Normocephalic and atraumatic  Right Ear: Hearing, tympanic membrane, external ear and ear canal normal    Left Ear: Hearing, tympanic membrane, external ear and ear canal normal    Nose: Nose normal    Neck: Trachea normal and phonation normal  No tracheal tenderness present  No tracheal deviation present  Cardiovascular: Normal rate, regular rhythm, S1 normal, S2 normal, normal heart sounds and intact distal pulses  Exam reveals no gallop and no friction rub  No murmur heard  Pulses:       Radial pulses are 2+ on the right side, and 2+ on the left side  Dorsalis pedis pulses are 2+ on the right side, and 2+ on the left side          Posterior tibial pulses are 2+ on the right side, and 2+ on the left side  Pulmonary/Chest: Effort normal and breath sounds normal  No stridor  No respiratory distress  She has no decreased breath sounds  She has no wheezes  She has no rhonchi  She has no rales  She exhibits no tenderness  Musculoskeletal: Normal range of motion  She exhibits no edema, tenderness or deformity  Lymphadenopathy:        Head (right side): No submental, no submandibular, no tonsillar, no preauricular and no posterior auricular adenopathy present  Head (left side): No submental, no submandibular, no tonsillar, no preauricular and no posterior auricular adenopathy present  She has no cervical adenopathy  Neurological: She is alert and oriented to person, place, and time  GCS eye subscore is 4  GCS verbal subscore is 5  GCS motor subscore is 6  Skin: Skin is warm, dry and intact  She is not diaphoretic  Nursing note and vitals reviewed        Vital Signs  ED Triage Vitals   Temperature Pulse Respirations Blood Pressure SpO2   03/13/20 1718 03/13/20 1714 03/13/20 1714 03/13/20 1714 03/13/20 1714   98 5 °F (36 9 °C) 82 18 116/71 98 %      Temp Source Heart Rate Source Patient Position - Orthostatic VS BP Location FiO2 (%)   03/13/20 1718 03/13/20 1714 03/13/20 1714 03/13/20 1714 --   Temporal Monitor Sitting Right arm       Pain Score       03/13/20 1714       8           Vitals:    03/13/20 1714 03/13/20 1757   BP: 116/71 121/83   Pulse: 82 97   Patient Position - Orthostatic VS: Sitting Standing         Visual Acuity      ED Medications  Medications - No data to display    Diagnostic Studies  Results Reviewed     None                 No orders to display              Procedures  Procedures         ED Course         MDM      Disposition  Final diagnoses:   Viral syndrome   URI (upper respiratory infection)     Time reflects when diagnosis was documented in both MDM as applicable and the Disposition within this note     Time User Action Codes Description Comment    3/13/2020  5:56 PM Oracio Saenz Add [B34 9] Viral syndrome     3/13/2020  5:56 PM Oracio Acostaat Add [J06 9] URI (upper respiratory infection)       ED Disposition     ED Disposition Condition Date/Time Comment    Discharge Stable Fri Mar 13, 2020  5:56 PM Flaca Lopez discharge to home/self care  Follow-up Information     Follow up With Specialties Details Why [address] Mely Harley PA-C Family Medicine, Physician Assistant Schedule an appointment as soon as possible for a visit  If symptoms worsen or have not started to improve in about 5-6 days [address]  [place] [id number]  [phone]            Patient's Medications   Discharge Prescriptions    FLUTICASONE (FLONASE) 50 MCG/ACT NASAL SPRAY    1 spray into each nostril 2 (two) times a day for 14 days       Start Date: 3/13/2020 End Date: 3/27/2020       Order Dose: 1 spray       Quantity: 1 Bottle    Refills: 0     No discharge procedures on file      PDMP Review     None          ED Provider  Electronically Signed by           Tiera Glover DO  03/14/20 0104

## 2020-04-09 ENCOUNTER — TELEPHONE (OUTPATIENT)
Dept: OTHER | Facility: OTHER | Age: 30
End: 2020-04-09

## 2020-04-15 ENCOUNTER — CLINICAL SUPPORT (OUTPATIENT)
Dept: FAMILY MEDICINE CLINIC | Facility: CLINIC | Age: 30
End: 2020-04-15
Payer: COMMERCIAL

## 2020-04-15 DIAGNOSIS — Z11.1 SCREENING FOR TUBERCULOSIS: Primary | ICD-10-CM

## 2020-04-15 PROCEDURE — 86580 TB INTRADERMAL TEST: CPT

## 2020-04-17 LAB
INDURATION: 0 MM
TB SKIN TEST: NEGATIVE

## 2021-05-31 ENCOUNTER — HOSPITAL ENCOUNTER (EMERGENCY)
Facility: HOSPITAL | Age: 31
Discharge: HOME | End: 2021-05-31
Attending: EMERGENCY MEDICINE | Admitting: EMERGENCY MEDICINE
Payer: COMMERCIAL

## 2021-05-31 ENCOUNTER — APPOINTMENT (EMERGENCY)
Dept: CT IMAGING | Facility: HOSPITAL | Age: 31
End: 2021-05-31
Payer: COMMERCIAL

## 2021-05-31 ENCOUNTER — APPOINTMENT (EMERGENCY)
Dept: RADIOLOGY | Facility: HOSPITAL | Age: 31
End: 2021-05-31
Payer: COMMERCIAL

## 2021-05-31 VITALS
HEIGHT: 61 IN | DIASTOLIC BLOOD PRESSURE: 70 MMHG | OXYGEN SATURATION: 100 % | WEIGHT: 175.93 LBS | BODY MASS INDEX: 33.22 KG/M2 | HEART RATE: 78 BPM | SYSTOLIC BLOOD PRESSURE: 132 MMHG | TEMPERATURE: 96.5 F | RESPIRATION RATE: 19 BRPM

## 2021-05-31 DIAGNOSIS — N39.0 UTI (URINARY TRACT INFECTION): Primary | ICD-10-CM

## 2021-05-31 DIAGNOSIS — R42 VERTIGO: ICD-10-CM

## 2021-05-31 LAB
ANION GAP SERPL CALCULATED.3IONS-SCNC: 9 MMOL/L (ref 4–13)
BACTERIA UR QL AUTO: ABNORMAL /HPF
BASOPHILS # BLD AUTO: 0.05 THOUSANDS/ΜL (ref 0–0.1)
BASOPHILS NFR BLD AUTO: 1 % (ref 0–1)
BILIRUB UR QL STRIP: ABNORMAL
BUN SERPL-MCNC: 13 MG/DL (ref 5–25)
CALCIUM SERPL-MCNC: 8.9 MG/DL (ref 8.3–10.1)
CHLORIDE SERPL-SCNC: 106 MMOL/L (ref 100–108)
CLARITY UR: ABNORMAL
CO2 SERPL-SCNC: 25 MMOL/L (ref 21–32)
COLOR UR: ABNORMAL
CREAT SERPL-MCNC: 0.78 MG/DL (ref 0.6–1.3)
EOSINOPHIL # BLD AUTO: 0.07 THOUSAND/ΜL (ref 0–0.61)
EOSINOPHIL NFR BLD AUTO: 1 % (ref 0–6)
ERYTHROCYTE [DISTWIDTH] IN BLOOD BY AUTOMATED COUNT: 11.8 % (ref 11.6–15.1)
EXT PREG TEST URINE: NEGATIVE
EXT. CONTROL ED NAV: NORMAL
GFR SERPL CREATININE-BSD FRML MDRD: 104 ML/MIN/1.73SQ M
GLUCOSE SERPL-MCNC: 100 MG/DL (ref 65–140)
GLUCOSE UR STRIP-MCNC: NEGATIVE MG/DL
HCT VFR BLD AUTO: 41.8 % (ref 34.8–46.1)
HGB BLD-MCNC: 13.5 G/DL (ref 11.5–15.4)
HGB UR QL STRIP.AUTO: ABNORMAL
IMM GRANULOCYTES # BLD AUTO: 0.01 THOUSAND/UL (ref 0–0.2)
IMM GRANULOCYTES NFR BLD AUTO: 0 % (ref 0–2)
KETONES UR STRIP-MCNC: ABNORMAL MG/DL
LEUKOCYTE ESTERASE UR QL STRIP: ABNORMAL
LYMPHOCYTES # BLD AUTO: 2.4 THOUSANDS/ΜL (ref 0.6–4.47)
LYMPHOCYTES NFR BLD AUTO: 41 % (ref 14–44)
MCH RBC QN AUTO: 27.9 PG (ref 26.8–34.3)
MCHC RBC AUTO-ENTMCNC: 32.3 G/DL (ref 31.4–37.4)
MCV RBC AUTO: 86 FL (ref 82–98)
MONOCYTES # BLD AUTO: 0.5 THOUSAND/ΜL (ref 0.17–1.22)
MONOCYTES NFR BLD AUTO: 8 % (ref 4–12)
NEUTROPHILS # BLD AUTO: 2.9 THOUSANDS/ΜL (ref 1.85–7.62)
NEUTS SEG NFR BLD AUTO: 49 % (ref 43–75)
NITRITE UR QL STRIP: POSITIVE
NON-SQ EPI CELLS URNS QL MICRO: ABNORMAL /HPF
NRBC BLD AUTO-RTO: 0 /100 WBCS
PH UR STRIP.AUTO: 5 [PH]
PLATELET # BLD AUTO: 294 THOUSANDS/UL (ref 149–390)
PMV BLD AUTO: 9.8 FL (ref 8.9–12.7)
POTASSIUM SERPL-SCNC: 4.4 MMOL/L (ref 3.5–5.3)
PROT UR STRIP-MCNC: ABNORMAL MG/DL
RBC # BLD AUTO: 4.84 MILLION/UL (ref 3.81–5.12)
RBC #/AREA URNS AUTO: ABNORMAL /HPF
SODIUM SERPL-SCNC: 140 MMOL/L (ref 136–145)
SP GR UR STRIP.AUTO: >=1.03 (ref 1–1.03)
TROPONIN I SERPL-MCNC: <0.02 NG/ML
UROBILINOGEN UR QL STRIP.AUTO: 1 E.U./DL
WBC # BLD AUTO: 5.93 THOUSAND/UL (ref 4.31–10.16)
WBC #/AREA URNS AUTO: ABNORMAL /HPF

## 2021-05-31 PROCEDURE — 99285 EMERGENCY DEPT VISIT HI MDM: CPT

## 2021-05-31 PROCEDURE — 85025 COMPLETE CBC W/AUTO DIFF WBC: CPT | Performed by: EMERGENCY MEDICINE

## 2021-05-31 PROCEDURE — 93005 ELECTROCARDIOGRAM TRACING: CPT

## 2021-05-31 PROCEDURE — 73502 X-RAY EXAM HIP UNI 2-3 VIEWS: CPT

## 2021-05-31 PROCEDURE — 81001 URINALYSIS AUTO W/SCOPE: CPT | Performed by: EMERGENCY MEDICINE

## 2021-05-31 PROCEDURE — 70496 CT ANGIOGRAPHY HEAD: CPT

## 2021-05-31 PROCEDURE — 81025 URINE PREGNANCY TEST: CPT | Performed by: EMERGENCY MEDICINE

## 2021-05-31 PROCEDURE — 99284 EMERGENCY DEPT VISIT MOD MDM: CPT | Performed by: EMERGENCY MEDICINE

## 2021-05-31 PROCEDURE — 84484 ASSAY OF TROPONIN QUANT: CPT | Performed by: EMERGENCY MEDICINE

## 2021-05-31 PROCEDURE — 70498 CT ANGIOGRAPHY NECK: CPT

## 2021-05-31 PROCEDURE — 80048 BASIC METABOLIC PNL TOTAL CA: CPT | Performed by: EMERGENCY MEDICINE

## 2021-05-31 RX ORDER — MECLIZINE HCL 12.5 MG/1
25 TABLET ORAL ONCE
Status: COMPLETED | OUTPATIENT
Start: 2021-05-31 | End: 2021-05-31

## 2021-05-31 RX ORDER — MECLIZINE HCL 12.5 MG/1
12.5 TABLET ORAL 3 TIMES DAILY PRN
Qty: 30 TABLET | Refills: 0 | Status: SHIPPED | OUTPATIENT
Start: 2021-05-31

## 2021-05-31 RX ORDER — CEPHALEXIN 500 MG/1
500 CAPSULE ORAL 2 TIMES DAILY
Qty: 14 CAPSULE | Refills: 0 | Status: SHIPPED | OUTPATIENT
Start: 2021-05-31 | End: 2021-06-07

## 2021-05-31 RX ORDER — CEPHALEXIN 250 MG/1
500 CAPSULE ORAL ONCE
Status: COMPLETED | OUTPATIENT
Start: 2021-05-31 | End: 2021-05-31

## 2021-05-31 RX ADMIN — CEPHALEXIN 500 MG: 250 CAPSULE ORAL at 13:14

## 2021-05-31 RX ADMIN — IOHEXOL 100 ML: 350 INJECTION, SOLUTION INTRAVENOUS at 12:25

## 2021-05-31 RX ADMIN — MECLIZINE 25 MG: 12.5 TABLET ORAL at 11:51

## 2021-05-31 NOTE — ED PROVIDER NOTES
History  Chief Complaint   Patient presents with    Dizziness     patient reports that she has been feeling blood rush from her head to her feet and then numbness in all extremities since last night  also reports left sided abdominal pain     HPI  32-year-old female comes in with multiple complaints  1st complaint she states that she feels vertiginous, she feels like room is spinning around  2nd complaint, she states she feels like with standing since yesterday, that the [address] will rush from her head down to her feet she describes is when she stands she feels dizzy then she feels chills  She also states that she has left hip pain, denies any falls denies any trauma, denies any back pain, denies any radicular pain down her legs, denies any bowel or bladder incontinence or saddle anesthesia  Patient denies any abdominal pain denies any vaginal discharge or vaginal pain  Prior to Admission Medications   Prescriptions Last Dose Informant Patient Reported? Taking?    Prenatal Vit-Fe Fumarate-FA (PRENATAL MULTIVITAMIN) 28-0 8 MG TABS   Yes No   Sig: Take 1 tablet by mouth daily   acetaminophen (TYLENOL) 325 mg tablet   No No   Sig: Take 2 tablets (650 mg total) by mouth every 6 (six) hours as needed for mild pain or headaches   benzocaine-menthol-lanolin-aloe (DERMOPLAST) 20-0 5 % topical spray Not Taking at Unknown time  No No   Sig: Apply 1 application topically 4 (four) times a day as needed for mild pain   Patient not taking: Reported on 8/15/2018    cholecalciferol (VITAMIN D3) 25 mcg (1,000 units) tablet   Yes No   Sig: Take 1,000 Units by mouth daily   docusate sodium (COLACE) 100 mg capsule   Yes No   Sig: Take 100 mg by mouth 2 (two) times a day   ferrous sulfate 325 (65 Fe) mg tablet   Yes No   Sig: Take 325 mg by mouth daily with breakfast   fluticasone (FLONASE) 50 mcg/act nasal spray   No No   Si spray into each nostril 2 (two) times a day for 14 days   ibuprofen (MOTRIN) 200 mg tablet Not Taking at Unknown time  No No   Sig: Take 3 tablets (600 mg total) by mouth every 6 (six) hours as needed for mild pain   Patient not taking: Reported on 3/4/2020   witch hazel-glycerin (TUCKS) topical pad Not Taking at Unknown time  No No   Sig: Apply 1 pad topically as needed for irritation   Patient not taking: Reported on 8/15/2018       Facility-Administered Medications: None       Past Medical History:   Diagnosis Date    IUD migration     misplaced IUD, last assessed 11/19/13       Past Surgical History:   Procedure Laterality Date    NO PAST SURGERIES      OTHER SURGICAL HISTORY      IUD removal, last assessed 12/8/15       Family History   Problem Relation Age of Onset    Asthma Mother     Heart attack Father     Diabetes Maternal Grandmother     Coronary artery disease Father     Diabetes Family     No Known Problems Sister     No Known Problems Brother     No Known Problems Son      I have reviewed and agree with the history as documented  E-Cigarette/Vaping     E-Cigarette/Vaping Substances     Social History     Tobacco Use    Smoking status: Never Smoker    Smokeless tobacco: Never Used   Substance Use Topics    Alcohol use: No    Drug use: No       Review of Systems   Constitutional: Positive for chills  Negative for fever  HENT: Negative  Negative for congestion and sore throat  Eyes: Negative  Negative for discharge and redness  Respiratory: Negative  Negative for chest tightness and shortness of breath  Cardiovascular: Negative  Negative for chest pain and palpitations  Gastrointestinal: Negative  Negative for abdominal pain, nausea and vomiting  Endocrine: Negative  Negative for cold intolerance and polyphagia  Genitourinary: Negative  Negative for difficulty urinating and dysuria  Musculoskeletal: Negative  Negative for arthralgias and back pain  Left hip pain   Skin: Negative  Negative for color change and wound  Allergic/Immunologic: Negative  Negative for environmental allergies  Neurological: Positive for dizziness  Negative for weakness and headaches  Hematological: Negative  Psychiatric/Behavioral: Negative  Negative for behavioral problems  The patient is not nervous/anxious  All other systems reviewed and are negative  Physical Exam  Physical Exam  Vitals signs and nursing note reviewed  Constitutional:       General: She is not in acute distress  Appearance: She is well-developed  She is not diaphoretic  HENT:      Head: Normocephalic and atraumatic  Right Ear: External ear normal       Left Ear: External ear normal       Nose: No congestion or rhinorrhea  Eyes:      General: No scleral icterus  Right eye: No discharge  Left eye: No discharge  Conjunctiva/sclera: Conjunctivae normal       Pupils: Pupils are equal, round, and reactive to light  Neck:      Musculoskeletal: Normal range of motion and neck supple  No neck rigidity or muscular tenderness  Thyroid: No thyromegaly  Trachea: No tracheal deviation  Cardiovascular:      Rate and Rhythm: Regular rhythm  Heart sounds: No murmur  No friction rub  No gallop  Pulmonary:      Effort: Pulmonary effort is normal  No respiratory distress  Breath sounds: Normal breath sounds  No stridor  No wheezing or rales  Abdominal:      General: Bowel sounds are normal  There is no distension  Palpations: Abdomen is soft  Tenderness: There is no abdominal tenderness  There is no guarding or rebound  Musculoskeletal: Normal range of motion  General: No tenderness, deformity or signs of injury  Skin:     General: Skin is warm and dry  Findings: No erythema or rash  Neurological:      General: No focal deficit present  Mental Status: She is alert and oriented to person, place, and time  Cranial Nerves: No cranial nerve deficit        Comments: Neuro exam nonfocal, no dysmetria with finger-to-nose or heel-to-shin, no dysdiadochokinesis with quick alternating movements, normal tandem gait, no nystagmus with extraocular movements  Psychiatric:         Behavior: Behavior normal          Thought Content:  Thought content normal          Vital Signs  ED Triage Vitals [05/31/21 1100]   Temperature Pulse Respirations Blood Pressure SpO2   (!) 96 5 °F (35 8 °C) 98 16 139/66 98 %      Temp Source Heart Rate Source Patient Position - Orthostatic VS BP Location FiO2 (%)   Temporal Monitor Sitting Right arm --      Pain Score       Worst Possible Pain           Vitals:    05/31/21 1100 05/31/21 1130 05/31/21 1230 05/31/21 1245   BP: 139/66 121/81  132/70   Pulse: 98 87 82 78   Patient Position - Orthostatic VS: Sitting Lying Lying Lying         Visual Acuity  Visual Acuity      Most Recent Value   L Pupil Size (mm)  3   R Pupil Size (mm)  3          ED Medications  Medications   meclizine (ANTIVERT) tablet 25 mg (25 mg Oral Given 5/31/21 1151)   iohexol (OMNIPAQUE) 350 MG/ML injection (SINGLE-DOSE) 100 mL (100 mL Intravenous Given 5/31/21 1225)   cephalexin (KEFLEX) capsule 500 mg (500 mg Oral Given 5/31/21 1314)       Diagnostic Studies  Results Reviewed     Procedure Component Value Units Date/Time    Urine Microscopic [[id number]]  (Abnormal) Collected: 05/31/21 1150    Lab Status: Final result Specimen: Urine, Other Updated: 05/31/21 1207     RBC, UA Innumerable /hpf      WBC, UA 10-20 /hpf      Epithelial Cells Occasional /hpf      Bacteria, UA Innumerable /hpf     Troponin I [[id number]]  (Normal) Collected: 05/31/21 1141    Lab Status: Final result Specimen: Blood from Arm, Left Updated: 05/31/21 1203     Troponin I <0 02 ng/mL     UA (URINE) with reflex to Scope [[id number]]  (Abnormal) Collected: 05/31/21 1150    Lab Status: Final result Specimen: Urine, Other Updated: 05/31/21 1157     Color, UA Red     Clarity, UA Cloudy     Specific Gravity, UA >=1 030     pH, UA 5 0     Leukocytes, UA Trace     Nitrite, UA Positive     Protein,  (2+) mg/dl      Glucose, UA Negative mg/dl      Ketones, UA Trace mg/dl      Urobilinogen, UA 1 0 E U /dl      Bilirubin, UA Interference- unable to analyze     Blood, UA Large    Basic metabolic panel [[id number]] Collected: 05/31/21 1141    Lab Status: Final result Specimen: Blood from Arm, Left Updated: 05/31/21 1154     Sodium 140 mmol/L      Potassium 4 4 mmol/L      Chloride 106 mmol/L      CO2 25 mmol/L      ANION GAP 9 mmol/L      BUN 13 mg/dL      Creatinine 0 78 mg/dL      Glucose 100 mg/dL      Calcium 8 9 mg/dL      eGFR 104 ml/min/1 73sq m     Narrative:      National Kidney Disease Foundation guidelines for Chronic Kidney Disease (CKD):     Stage 1 with normal or high GFR (GFR > 90 mL/min/1 73 square meters)    Stage 2 Mild CKD (GFR = 60-89 mL/min/1 73 square meters)    Stage 3A Moderate CKD (GFR = 45-59 mL/min/1 73 square meters)    Stage 3B Moderate CKD (GFR = 30-44 mL/min/1 73 square meters)    Stage 4 Severe CKD (GFR = 15-29 mL/min/1 73 square meters)    Stage 5 End Stage CKD (GFR <15 mL/min/1 73 square meters)  Note: GFR calculation is accurate only with a steady state creatinine    POCT pregnancy, urine [[id number]]  (Normal) Resulted: 05/31/21 1150    Lab Status: Final result Updated: 05/31/21 1150     EXT PREG TEST UR (Ref: Negative) negative     Control valid    CBC and differential [[id number]] Collected: 05/31/21 1141    Lab Status: Final result Specimen: Blood from Arm, Left Updated: 05/31/21 1146     WBC 5 93 Thousand/uL      RBC 4 84 Million/uL      Hemoglobin 13 5 g/dL      Hematocrit 41 8 %      MCV 86 fL      MCH 27 9 pg      MCHC 32 3 g/dL      RDW 11 8 %      MPV 9 8 fL      Platelets 408 Thousands/uL      nRBC 0 /100 WBCs      Neutrophils Relative 49 %      Immat GRANS % 0 %      Lymphocytes Relative 41 %      Monocytes Relative 8 %      Eosinophils Relative 1 %      Basophils Relative 1 %      Neutrophils Absolute 2 90 Thousands/µL      Immature Grans Absolute 0 01 Thousand/uL      Lymphocytes Absolute 2 40 Thousands/µL      Monocytes Absolute 0 50 Thousand/µL      Eosinophils Absolute 0 07 Thousand/µL      Basophils Absolute 0 05 Thousands/µL                  CTA head and neck with and without contrast   Final Result by Renetta Anderson DO (05/31 1245)      Normal CT of the brain  Normal CTA of the neck and brain  Workstation performed: [id number]         XR hip/pelv 2-3 vws left   Final Result by Magali Turner MD (05/31 5329)      No acute osseous abnormality  Workstation performed: [id number]                    Procedures  Procedures         ED Course  ED Course as of May 31 1600   Mon May 31, 2021   1309 Workup for vertigo was unremarkable with a normal CTA head neck  I do believe that the pain that the patient is having his from her urinary tract infection  I will start her on Keflex  Her EKG is otherwise normal, blood work is normal   Will discharge patient with both Keflex and meclizine for her vertigo  1309 This EKG was interpreted by me  The EKG demonstrates Normal sinus rhythm, normal intervals and axis, normal QRS, no acute ST changes present  SBIRT 20yo+      Most Recent Value   SBIRT (24 yo +)   In order to provide better care to our patients, we are screening all of our patients for alcohol and drug use  Would it be okay to ask you these screening questions? Yes Filed at: 05/31/2021 1124   Initial Alcohol Screen: US AUDIT-C    1  How often do you have a drink containing alcohol?  0 Filed at: 05/31/2021 1124   2  How many drinks containing alcohol do you have on a typical day you are drinking? 0 Filed at: 05/31/2021 1124   3b  FEMALE Any Age, or MALE 65+: How often do you have 4 or more drinks on one occassion? 0 Filed at: 05/31/2021 1124   Audit-C Score  0 Filed at: 05/31/2021 1124   FLOWER: How many times in the past year have you       Used an illegal drug or used a prescription medication for non-medical reasons? Never Filed at: 05/31/2021 1124                    [facility]  Number of Diagnoses or Management Options  UTI (urinary tract infection):   Vertigo:   Diagnosis management comments: 26-year-old female presents with vertigo and left hip pain  Will evaluate with CBC, CMP, troponin EKG  Will get CTA head neck with without contrast given the vertigo, will get urinalysis urine pregnancy  Disposition will be pending results of workup  Disposition  Final diagnoses:   UTI (urinary tract infection)   Vertigo     Time reflects when diagnosis was documented in both MDM as applicable and the Disposition within this note     Time User Action Codes Description Comment    5/31/2021  1:10 PM Prince Luther Dugan [N39 [id number]] UTI (urinary tract infection)     5/31/2021  1:10 PM Prince Luther Dugan [R42] Vertigo       ED Disposition     ED Disposition Condition Date/Time Comment    Discharge Stable Mon May 31, 2021  1:09 PM Henri Herring discharge to home/self care              Follow-up Information     Follow up With Specialties Details Why Contact Info Additional [address], [facility] Family [facility], Physician Assistant Call in 1 day follow up being seen in the emergency department [address]  [facility] [address] [facility] Emergency Department Emergency Medicine Go to  As needed, If symptoms worsen [place] [phone]  [facility] Emergency Department, [address], [address]          Discharge Medication List as of 5/31/2021  1:11 PM      START taking these medications    Details   cephalexin (KEFLEX) 500 mg capsule Take 1 capsule (500 mg total) by mouth 2 (two) times a day for 7 days, Starting Mon 5/31/2021, Until Mon 6/7/2021, Normal      meclizine (ANTIVERT) 12 5 MG tablet Take 1 tablet (12 5 mg total) by mouth 3 (three) times a day as needed for dizziness, Starting Mon 5/31/2021, Normal         CONTINUE these medications which have NOT CHANGED    Details   acetaminophen (TYLENOL) 325 mg tablet Take 2 tablets (650 mg total) by mouth every 6 (six) hours as needed for mild pain or headaches, Starting Sun 3/25/2018, No Print      benzocaine-menthol-lanolin-aloe (DERMOPLAST) 20-0 5 % topical spray Apply 1 application topically 4 (four) times a day as needed for mild pain, Starting Sun 3/25/2018, No Print      cholecalciferol (VITAMIN D3) 25 mcg (1,000 units) tablet Take 1,000 Units by mouth daily, Historical Med      docusate sodium (COLACE) 100 mg capsule Take 100 mg by mouth 2 (two) times a day, Historical Med      ferrous sulfate 325 (65 Fe) mg tablet Take 325 mg by mouth daily with breakfast, Historical Med      fluticasone (FLONASE) 50 mcg/act nasal spray 1 spray into each nostril 2 (two) times a day for 14 days, Starting Fri 3/13/2020, Until Fri 3/27/2020, Normal      ibuprofen (MOTRIN) 200 mg tablet Take 3 tablets (600 mg total) by mouth every 6 (six) hours as needed for mild pain, Starting Sun 3/25/2018, No Print      Prenatal Vit-Fe Fumarate-FA (PRENATAL MULTIVITAMIN) 28-0 8 MG TABS Take 1 tablet by mouth daily, Historical Med      witch hazel-glycerin (TUCKS) topical pad Apply 1 pad topically as needed for irritation, Starting Sun 3/25/2018, No Print           No discharge procedures on file      PDMP Review     None          ED Provider  Electronically Signed by           Davina Gonsalves MD  05/31/21 96

## 2021-06-01 LAB
ATRIAL RATE: 85 BPM
P AXIS: 62 DEGREES
PR INTERVAL: 150 MS
QRS AXIS: 56 DEGREES
QRSD INTERVAL: 78 MS
QT INTERVAL: 380 MS
QTC INTERVAL: 452 MS
T WAVE AXIS: 38 DEGREES
VENTRICULAR RATE: 85 BPM

## 2021-06-01 PROCEDURE — 93010 ELECTROCARDIOGRAM REPORT: CPT | Performed by: INTERNAL MEDICINE

## 2021-10-13 ENCOUNTER — HOSPITAL ENCOUNTER (EMERGENCY)
Facility: HOSPITAL | Age: 31
Discharge: HOME | End: 2021-10-13
Attending: EMERGENCY MEDICINE
Payer: COMMERCIAL

## 2021-10-13 VITALS
WEIGHT: 153 LBS | SYSTOLIC BLOOD PRESSURE: 133 MMHG | BODY MASS INDEX: 28.89 KG/M2 | DIASTOLIC BLOOD PRESSURE: 81 MMHG | OXYGEN SATURATION: 100 % | HEIGHT: 61 IN | TEMPERATURE: 97.5 F | HEART RATE: 87 BPM | RESPIRATION RATE: 18 BRPM

## 2021-10-13 DIAGNOSIS — R10.9 ABDOMINAL PAIN: Primary | ICD-10-CM

## 2021-10-13 LAB
ALBUMIN SERPL BCP-MCNC: 3.7 G/DL (ref 3.5–5)
ALP SERPL-CCNC: 87 U/L (ref 46–116)
ALT SERPL W P-5'-P-CCNC: 15 U/L (ref 12–78)
ANION GAP SERPL CALCULATED.3IONS-SCNC: 9 MMOL/L (ref 4–13)
AST SERPL W P-5'-P-CCNC: 11 U/L (ref 5–45)
BASOPHILS # BLD AUTO: 0.07 THOUSANDS/ΜL (ref 0–0.1)
BASOPHILS NFR BLD AUTO: 1 % (ref 0–1)
BILIRUB SERPL-MCNC: 0.36 MG/DL (ref 0.2–1)
BILIRUB UR QL STRIP: NEGATIVE
BUN SERPL-MCNC: 10 MG/DL (ref 5–25)
CALCIUM SERPL-MCNC: 8.6 MG/DL (ref 8.3–10.1)
CHLORIDE SERPL-SCNC: 105 MMOL/L (ref 100–108)
CLARITY UR: CLEAR
CO2 SERPL-SCNC: 27 MMOL/L (ref 21–32)
COLOR UR: YELLOW
CREAT SERPL-MCNC: 0.83 MG/DL (ref 0.6–1.3)
EOSINOPHIL # BLD AUTO: 0.09 THOUSAND/ΜL (ref 0–0.61)
EOSINOPHIL NFR BLD AUTO: 1 % (ref 0–6)
ERYTHROCYTE [DISTWIDTH] IN BLOOD BY AUTOMATED COUNT: 11.9 % (ref 11.6–15.1)
EXT PREG TEST URINE: NEGATIVE
EXT. CONTROL ED NAV: NORMAL
GFR SERPL CREATININE-BSD FRML MDRD: 96 ML/MIN/1.73SQ M
GLUCOSE SERPL-MCNC: 86 MG/DL (ref 65–140)
GLUCOSE UR STRIP-MCNC: NEGATIVE MG/DL
HCT VFR BLD AUTO: 37.4 % (ref 34.8–46.1)
HGB BLD-MCNC: 11.8 G/DL (ref 11.5–15.4)
HGB UR QL STRIP.AUTO: NEGATIVE
IMM GRANULOCYTES # BLD AUTO: 0.03 THOUSAND/UL (ref 0–0.2)
IMM GRANULOCYTES NFR BLD AUTO: 0 % (ref 0–2)
KETONES UR STRIP-MCNC: NEGATIVE MG/DL
LEUKOCYTE ESTERASE UR QL STRIP: NEGATIVE
LIPASE SERPL-CCNC: 114 U/L (ref 73–393)
LYMPHOCYTES # BLD AUTO: 3.59 THOUSANDS/ΜL (ref 0.6–4.47)
LYMPHOCYTES NFR BLD AUTO: 36 % (ref 14–44)
MCH RBC QN AUTO: 27.3 PG (ref 26.8–34.3)
MCHC RBC AUTO-ENTMCNC: 31.6 G/DL (ref 31.4–37.4)
MCV RBC AUTO: 87 FL (ref 82–98)
MONOCYTES # BLD AUTO: 0.72 THOUSAND/ΜL (ref 0.17–1.22)
MONOCYTES NFR BLD AUTO: 7 % (ref 4–12)
NEUTROPHILS # BLD AUTO: 5.59 THOUSANDS/ΜL (ref 1.85–7.62)
NEUTS SEG NFR BLD AUTO: 55 % (ref 43–75)
NITRITE UR QL STRIP: NEGATIVE
NRBC BLD AUTO-RTO: 0 /100 WBCS
PH UR STRIP.AUTO: 6.5 [PH]
PLATELET # BLD AUTO: 264 THOUSANDS/UL (ref 149–390)
PMV BLD AUTO: 10.4 FL (ref 8.9–12.7)
POTASSIUM SERPL-SCNC: 3.7 MMOL/L (ref 3.5–5.3)
PROT SERPL-MCNC: 6.8 G/DL (ref 6.4–8.2)
PROT UR STRIP-MCNC: NEGATIVE MG/DL
RBC # BLD AUTO: 4.32 MILLION/UL (ref 3.81–5.12)
SODIUM SERPL-SCNC: 141 MMOL/L (ref 136–145)
SP GR UR STRIP.AUTO: 1.02 (ref 1–1.03)
UROBILINOGEN UR QL STRIP.AUTO: 1 E.U./DL
WBC # BLD AUTO: 10.09 THOUSAND/UL (ref 4.31–10.16)

## 2021-10-13 PROCEDURE — 99282 EMERGENCY DEPT VISIT SF MDM: CPT | Performed by: EMERGENCY MEDICINE

## 2021-10-13 PROCEDURE — 99284 EMERGENCY DEPT VISIT MOD MDM: CPT

## 2021-10-13 PROCEDURE — 81025 URINE PREGNANCY TEST: CPT | Performed by: PHYSICIAN ASSISTANT

## 2021-10-13 PROCEDURE — 36415 COLL VENOUS BLD VENIPUNCTURE: CPT | Performed by: PHYSICIAN ASSISTANT

## 2021-10-13 PROCEDURE — 81003 URINALYSIS AUTO W/O SCOPE: CPT | Performed by: PHYSICIAN ASSISTANT

## 2021-10-13 PROCEDURE — 80053 COMPREHEN METABOLIC PANEL: CPT | Performed by: PHYSICIAN ASSISTANT

## 2021-10-13 PROCEDURE — 85025 COMPLETE CBC W/AUTO DIFF WBC: CPT | Performed by: PHYSICIAN ASSISTANT

## 2021-10-13 PROCEDURE — 83690 ASSAY OF LIPASE: CPT | Performed by: PHYSICIAN ASSISTANT

## 2021-11-01 ENCOUNTER — PATIENT OUTREACH (OUTPATIENT)
Dept: CASE MANAGEMENT | Facility: OTHER | Age: 31
End: 2021-11-01

## 2021-11-15 ENCOUNTER — PATIENT OUTREACH (OUTPATIENT)
Dept: CASE MANAGEMENT | Facility: OTHER | Age: 31
End: 2021-11-15

## 2021-11-16 ENCOUNTER — PATIENT OUTREACH (OUTPATIENT)
Dept: CASE MANAGEMENT | Facility: OTHER | Age: 31
End: 2021-11-16

## 2022-04-09 ENCOUNTER — HOSPITAL ENCOUNTER (EMERGENCY)
Facility: HOSPITAL | Age: 32
Discharge: HOME | End: 2022-04-09
Attending: EMERGENCY MEDICINE | Admitting: EMERGENCY MEDICINE
Payer: COMMERCIAL

## 2022-04-09 VITALS
BODY MASS INDEX: 31.15 KG/M2 | HEART RATE: 68 BPM | TEMPERATURE: 97.2 F | RESPIRATION RATE: 16 BRPM | HEIGHT: 61 IN | WEIGHT: 165 LBS | SYSTOLIC BLOOD PRESSURE: 130 MMHG | OXYGEN SATURATION: 99 % | DIASTOLIC BLOOD PRESSURE: 90 MMHG

## 2022-04-09 DIAGNOSIS — F43.21 GRIEF REACTION: ICD-10-CM

## 2022-04-09 DIAGNOSIS — R45.851 SUICIDAL IDEATION: Primary | ICD-10-CM

## 2022-04-09 DIAGNOSIS — F32.A DEPRESSION: ICD-10-CM

## 2022-04-09 DIAGNOSIS — H65.90 SEROUS OTITIS MEDIA: ICD-10-CM

## 2022-04-09 LAB
AMPHETAMINES SERPL QL SCN: NEGATIVE
BARBITURATES UR QL: NEGATIVE
BENZODIAZ UR QL: NEGATIVE
COCAINE UR QL: NEGATIVE
ETHANOL EXG-MCNC: 0 MG/DL
EXT PREG TEST URINE: NEGATIVE
EXT. CONTROL ED NAV: NORMAL
FLUAV RNA RESP QL NAA+PROBE: NEGATIVE
FLUBV RNA RESP QL NAA+PROBE: NEGATIVE
METHADONE UR QL: NEGATIVE
OPIATES UR QL SCN: NEGATIVE
OXYCODONE+OXYMORPHONE UR QL SCN: NEGATIVE
PCP UR QL: NEGATIVE
RSV RNA RESP QL NAA+PROBE: NEGATIVE
SARS-COV-2 RNA RESP QL NAA+PROBE: NEGATIVE
THC UR QL: NEGATIVE

## 2022-04-09 PROCEDURE — 0241U HB NFCT DS VIR RESP RNA 4 TRGT: CPT | Performed by: EMERGENCY MEDICINE

## 2022-04-09 PROCEDURE — 81025 URINE PREGNANCY TEST: CPT | Performed by: EMERGENCY MEDICINE

## 2022-04-09 PROCEDURE — 99243 OFF/OP CNSLTJ NEW/EST LOW 30: CPT | Performed by: PSYCHIATRY & NEUROLOGY

## 2022-04-09 PROCEDURE — 80307 DRUG TEST PRSMV CHEM ANLYZR: CPT | Performed by: EMERGENCY MEDICINE

## 2022-04-09 PROCEDURE — 99284 EMERGENCY DEPT VISIT MOD MDM: CPT

## 2022-04-09 PROCEDURE — 99285 EMERGENCY DEPT VISIT HI MDM: CPT | Performed by: EMERGENCY MEDICINE

## 2022-04-09 PROCEDURE — 82075 ASSAY OF BREATH ETHANOL: CPT | Performed by: EMERGENCY MEDICINE

## 2022-04-09 NOTE — ED NOTES
Pt provided 2 list of out patient resources upon discharge  Pt's belonging shante arboleda returned to pt        Jessica Stoner, RN  04/09/22 7948

## 2022-04-09 NOTE — ED NOTES
4/9/2022 @ 1030: This writer outreached the patients nurse and was connected with the patient to do a consult over the phone  Assessement: Patient reported to the ED requesting help due to feelings of "hopelessness and depression "  Patient also expressed that she "feels useless as she is homeless, jobless, and has no support "  Patient has family, two sons (3 and 8), who currently reside with the grandmother  The patient lost her "god sister" yesterday and disclosed that she has not slept in 3 days  The patient has strong mejia and believes in god, and feels as though she is failing herself "in the eyes of god "  Additional thoughts include: "Why am I still here? How am I useful on this earth?"  The patient is experiencing suicidal ideations with no plan  She wants to be present for her sons, and for her mejia  The patient currently sleeps in her car, she has HUD/Section 8 housing but there are not a lot of apartment facilities that accept this form of housing  She walks her sons to the bus stop in the morning, but they are always asking where she goes during the day/evening  The patient has no psychiatric treatment history, this is her first attempt at requesting help  Patient denies medical problems, legal issues, substance abuse, and smoking  The patient's mood is currently depressed, overwhelemed, and hopeless with her depression currently rated an 8/10  Patient is currently having passive suicidal ideations with no plan or intent, denies HI and SIB     4/9/2022 @ 1056: This writer discussed assessment at length with MD Ball and we both agreed that the patient is not deemed appropriate/safe for discharge  4/9/2022 @ 1100: This writer reconnected with the patient and encouraged inpatient treatment on a voluntary status    The patient refused and stated that "I am not having harming thoughts, I can't commit to something like that I would rather wait for outpatient treatment "  This writer expressed that there would be an array of support services provided, including case management to assist in allocating resources  Patient still refused  This writer messaged both the nurse and MD and requested that a Psychiatry consult be placed in evaluate the patient for support  Lucinda Foreman was called to put in assessment request     4/9/2022 @ [id number]: The patient's psychiatric assessment was completed and it was deemed appropriate for the patient to be discharged with outpatient resources  This writer faxed over outpatient referrals to the assigned nurse  This writer will then outreach the patent and go over them with her  4/9/2022 @ 1606: This writer outreached the patient and expressed that the patient would want to outreach the facilities on the "Local Therapist and Counselors Section "  The patient verbalized that she did not want medications, but was open to outpatient therapy  I advised that if her symptoms were to progress, to return back to the ED  A safety risk assessment was performed

## 2022-04-09 NOTE — ED NOTES
Crisis recommending 201 be signed by patient  Pt does not want to sign 201 at this time  Psychiatry consult to be conducted to implement further plan of care   Emily contacted for consult          Kermit Solorzano RN  04/09/22 7714

## 2022-04-09 NOTE — ED NOTES
Pt reports "feeling like she does not want to be here anymore " She reports 'things have not been going right  She is jobless, homeless her sister recently passed and she has been feeling down, hopeless and depressed " Pt admits SI w/o a specific plan  Denies HI, AH/VH  Pt calm, cooperative and tearful during intake assessment       Clarisa Lopez RN  04/09/22 8608

## 2022-04-09 NOTE — TELEMEDICINE
TeleConsultation - [address] 36 y o  female MRN: [id number]  Unit/Bed#: VP38 Encounter: [id number]        REQUIRED DOCUMENTATION:     1  This service was provided via Telemedicine  2  Provider located at [place]  3  TeleMed provider: Jenna Marie  4  Identify all parties in room with patient during tele consult:  Patient  5  Patient was then informed that this was a Telemedicine visit and that the exam was being conducted confidentially over secure lines  My office door was closed  No one else was in the room  Patient acknowledged consent and understanding of privacy and security of the Telemedicine visit, and gave us permission to have the assistant stay in the room in order to assist with the history and to conduct the exam   I informed the patient that I have reviewed their record in Epic and presented the opportunity for them to ask any questions regarding the visit today  The patient agreed to participate  Assessment/Plan     Assessment:  46-year-old female presenting here to request outpatient treatment following the death of her God sister find herself asking existential questions such as why a m  I still here?   The patient denies that she has had any actual suicidal thoughts  She appears to be suffering from an adjustment disorder with depressed mood following the death her God sister along with other recent stressors  Plan:   Risks, benefits and possible side effects of Medications:   Risks, benefits, and possible side effects of medications explained to patient and patient verbalizes understanding  Inpatient psychiatric treatment is not indicated as this patient denies that she has had any thoughts of taking her own and presents is very motivated for outpatient therapy to address her depression  She will need assistance with resources to access outpatient psychotherapy to assist her in dealing with her grief and current stressors      Chief Complaint:  Recent depression    History of Present Illness     Reason for Consult / Principal Problem:  Suicidal ideation and grief reaction    Patient is a 36 y o  female presented to the emergency department were crisis obtained documented the following information: Patient reported to the ED requesting help due to feelings of "hopelessness and depression  "  Patient also expressed that she "feels useless as she is homeless, jobless, and has no support "  Patient has family, two sons (3 and 8), who currently reside with the grandmother  Craig Kilgore patient lost her "god sister" yesterday and disclosed that she has not slept in 3 days  The patient has strong mejia and believes in god, and feels as though she is failing herself "in the eyes of god "  Additional thoughts include: "Why am I still here? How am I useful on this earth?"  The patient is experiencing suicidal ideations with no plan   She wants to be present for her sons, and for her mejia   The patient currently sleeps in her car, she has HUD/Section 8 housing but there are not a lot of apartment facilities that accept this form of housing   She walks her sons to the bus stop in the morning, but they are always asking where she goes during the day/evening  The patient has no psychiatric treatment history, this is her first attempt at requesting help  Mir Hartley denies medical problems, legal issues, substance abuse, and smoking   The patient's mood is currently depressed, overwhelemed, and hopeless with her depression currently an 8/10   Patient is currently having passive suicidal ideations with no plan or intent, denies HI and SIB       The patient explain to me that she has not had any actual thoughts of harming herself  She states that with the recent losses of stressors she has found herself asking excess then show questions such as white she still here  The patient reports recent poor sleep likely secondary to attempt to sleep of her car    She denies other vegetative features of depression  Past psychiatric history:  Unremarkable     Social history:  The patient is single with 2 young boys are currently residing their father until the patient can get back on her feet again  Patient is currently unemployed has just received a text messages this morning for job interview on Monday morning which has left her feeling much more positive optimistic regarding her outlook  Family history:  Unremarkable     Substance use history:  The patient use alcohol and any other substances to include nicotine  Mental status examination:  The patient is alert and well oriented in all spheres  Affect is pleasant  She says she has never experiencing depression before these last few days since the onset of illness and then death of her God sister on top of other stressors  She feels she needs to talk to a therapist help her better cope these issues and states that she found herself feeling much better after discussing situation with crisis earlier this morning  Additionally she states she has just received a text message confirming a job interview for Monday morning about which the patient feels extremely optimistic in feels that would be very positive event to help her move forward financially in her life  She admits to extension questions he is last couple of days since her son got sister  wondering why she still exist on this earth but states she has never had any thoughts of harming herself or taking her life  She denies homicidal ideation  Sensorium is clear  Thought process is logical linear  Content is reality based  She appears to be of average intelligence by her use vocabulary, sentence structure and syntax and general fund of knowledge  She denies hallucinations and other psychotic features  Insight and judgment are intact    She is highly motivated for outpatient therapy    Consult to Psychiatry  Consult performed by: Parker Torres ordered by: Myla Hernandez MD                Past Medical History:   Diagnosis Date    IUD migration     misplaced IUD, last assessed 11/19/13       Medical Review Of Systems:  Review of Systems    Meds/Allergies   all current active meds have been reviewed  No Known Allergies    Objective   Vital signs in last 24 hours:  Temp:  [97 2 °F (36 2 °C)] 97 2 °F (36 2 °C)  HR:  [82-98] 82  Resp:  [18] 18  BP: (125-132)/(77-88) 125/77    No intake or output data in the 24 hours ending 04/09/22 1303      Lab Results:  Reviewed  Imaging Studies:  Reviewed  EKG, Pathology, and Other Studies:  Reviewed    Code Status: Prior  Advance Directive and Living Will:      Power of :    POLST:      Counseling / Coordination of Care  Total floor / unit time spent today 30 minutes  Greater than 50% of total time was spent with the patient and / or family counseling and / or coordination of care  A description of the counseling / coordination of care:  Chart review, patient evaluation, coordination communication with staff, nursing and provider

## 2022-04-09 NOTE — ED NOTES
Provider at bedside to evaluate patient  Per provider pt is allowed to have personal blanket and cell phone at this time  All other belongings removed an secured in room 14 locker  Pt understands that cell phone is allowed at this time but will be removed if it causes her to become upset or agitated  Pt in paper scrubs and resting comfortably at this time  Virtual 1:1 I progress       Andrew Woods RN  04/09/22 3577

## 2022-04-09 NOTE — ED PROVIDER NOTES
History  Chief Complaint   Patient presents with    Psychiatric Evaluation     Patient states feeling "hopeless and down"  Patient states "I just don't want to be here"  Denies HI or active plan to harm self  Patient lost a family member and is homeless at this time   Earache     Patient c/o bilateral ear pain x 1 month     61-year-old female presents for psychiatric evaluation she feels helpless and down  She has no thoughts of harming anyone else she has no plans for harming herself someone very close to her is brain dead she visited her yesterday in [place] and this presents very important to her calls her her sister  She is homeless living out of her car  She has never seen a counselor before or has had any overnight Behavioral Health admissions  She feels depressed she has not slept for 3 days  Her only medical complaint is bilateral ear pain for the last month  She denies any drainage but feels if there is fluid in her ear she denies any fever chills no cough or upper respiratory complaints there is no sore throat she has had no chest pain pleuritic pain she shortness of breath or abdominal pain no nausea vomiting diarrhea no difficulties with urination no rashes  Past medical history is unremarkable patient has on no scheduled medications  Prior to Admission Medications   Prescriptions Last Dose Informant Patient Reported? Taking?    Prenatal Vit-Fe Fumarate-FA (PRENATAL MULTIVITAMIN) 28-0 8 MG TABS   Yes No   Sig: Take 1 tablet by mouth daily   acetaminophen (TYLENOL) 325 mg tablet   No No   Sig: Take 2 tablets (650 mg total) by mouth every 6 (six) hours as needed for mild pain or headaches   benzocaine-menthol-lanolin-aloe (DERMOPLAST) 20-0 5 % topical spray   No No   Sig: Apply 1 application topically 4 (four) times a day as needed for mild pain   Patient not taking: Reported on 8/15/2018    cholecalciferol (VITAMIN D3) 25 mcg (1,000 units) tablet   Yes No   Sig: Take 1,000 Units by mouth daily   docusate sodium (COLACE) 100 mg capsule   Yes No   Sig: Take 100 mg by mouth 2 (two) times a day   ferrous sulfate 325 (65 Fe) mg tablet   Yes No   Sig: Take 325 mg by mouth daily with breakfast   fluticasone (FLONASE) 50 mcg/act nasal spray   No No   Si spray into each nostril 2 (two) times a day for 14 days   ibuprofen (MOTRIN) 200 mg tablet   No No   Sig: Take 3 tablets (600 mg total) by mouth every 6 (six) hours as needed for mild pain   Patient not taking: Reported on 3/4/2020   meclizine (ANTIVERT) 12 5 MG tablet   No No   Sig: Take 1 tablet (12 5 mg total) by mouth 3 (three) times a day as needed for dizziness   witch hazel-glycerin (TUCKS) topical pad   No No   Sig: Apply 1 pad topically as needed for irritation   Patient not taking: Reported on 8/15/2018       Facility-Administered Medications: None       Past Medical History:   Diagnosis Date    IUD migration     misplaced IUD, last assessed 13       Past Surgical History:   Procedure Laterality Date    NO PAST SURGERIES      OTHER SURGICAL HISTORY      IUD removal, last assessed 12/8/15       Family History   Problem Relation Age of Onset    Asthma Mother     Heart attack Father     Diabetes Maternal Grandmother     Coronary artery disease Father     Diabetes Family     No Known Problems Sister     No Known Problems Brother     No Known Problems Son      I have reviewed and agree with the history as documented  E-Cigarette/Vaping     E-Cigarette/Vaping Substances     Social History     Tobacco Use    Smoking status: Never Smoker    Smokeless tobacco: Never Used   Substance Use Topics    Alcohol use: No    Drug use: No       Review of Systems   Constitutional: Negative for activity change, appetite change, chills and fever  HENT: Negative for congestion, ear pain, rhinorrhea, sneezing and sore throat  Eyes: Negative for discharge  Respiratory: Negative for cough and shortness of breath      Cardiovascular: Negative for chest pain and leg swelling  Gastrointestinal: Negative for abdominal pain, blood in stool, diarrhea, nausea and vomiting  Endocrine: Negative for polyuria  Genitourinary: Negative for difficulty urinating, dysuria, frequency and urgency  Musculoskeletal: Negative for back pain and myalgias  Skin: Negative for rash  Neurological: Negative for dizziness and numbness  Hematological: Negative for adenopathy  Psychiatric/Behavioral: Positive for sleep disturbance and suicidal ideas  Negative for confusion  All other systems reviewed and are negative  Physical Exam  Physical Exam  Vitals and nursing note reviewed  Constitutional:       General: She is not in acute distress  Appearance: She is well-developed  She is not ill-appearing, toxic-appearing or diaphoretic  HENT:      Head: Normocephalic and atraumatic  Right Ear: External ear normal       Left Ear: External ear normal       Ears:      Comments: Bilateral effusion no erythema     Nose: Nose normal       Mouth/Throat:      Mouth: Mucous membranes are moist       Pharynx: No oropharyngeal exudate or posterior oropharyngeal erythema  Eyes:      General:         Right eye: No discharge  Left eye: No discharge  Extraocular Movements: Extraocular movements intact  Conjunctiva/sclera: Conjunctivae normal       Pupils: Pupils are equal, round, and reactive to light  Neck:      Comments: No midline or paraspinous tenderness  Cardiovascular:      Rate and Rhythm: Normal rate and regular rhythm  Heart sounds: Normal heart sounds  Pulmonary:      Effort: Pulmonary effort is normal  No respiratory distress  Breath sounds: Normal breath sounds  No stridor  No wheezing, rhonchi or rales  Abdominal:      General: Bowel sounds are normal  There is no distension  Palpations: Abdomen is soft  Tenderness: There is no abdominal tenderness   There is no right CVA tenderness, left CVA tenderness, guarding or rebound  Comments: Back no midline or CVA tenderness   Musculoskeletal:         General: No tenderness or deformity  Normal range of motion  Cervical back: Normal range of motion and neck supple  Right lower leg: No edema  Left lower leg: No edema  Skin:     General: Skin is warm and dry  Neurological:      Mental Status: She is alert and oriented to person, place, and time  Cranial Nerves: No cranial nerve deficit  Sensory: No sensory deficit  Motor: No weakness or abnormal muscle tone  Coordination: Coordination normal       Gait: Gait normal       Comments: Gait steady   Psychiatric:      Comments: Tearful flat         Vital Signs  ED Triage Vitals [04/09/22 0819]   Temperature Pulse Respirations Blood Pressure SpO2   (!) 97 2 °F (36 2 °C) 98 18 132/88 99 %      Temp Source Heart Rate Source Patient Position - Orthostatic VS BP Location FiO2 (%)   Temporal Monitor Sitting Left arm --      Pain Score       No Pain           Vitals:    04/09/22 0819 04/09/22 1120 04/09/22 1513   BP: 132/88 125/77 130/90   Pulse: 98 82 68   Patient Position - Orthostatic VS: Sitting Sitting Sitting         Visual Acuity      ED Medications  Medications - No data to display    Diagnostic Studies  Results Reviewed     Procedure Component Value Units Date/Time    COVID/FLU/RSV - 2 hour TAT [[id number]]  (Normal) Collected: 04/09/22 0836    Lab Status: Final result Specimen: Nares from Nose Updated: 04/09/22 0938     SARS-CoV-2 Negative     INFLUENZA A PCR Negative     INFLUENZA B PCR Negative     RSV PCR Negative    Narrative:      FOR PEDIATRIC PATIENTS - copy/paste COVID Guidelines URL to browser: [email or web address]    SARS-CoV-2 assay is a Nucleic Acid Amplification assay intended for the  qualitative detection of nucleic acid from SARS-CoV-2 in nasopharyngeal  swabs   Results are for the presumptive identification of SARS-CoV-2 RNA  Positive results are indicative of infection with SARS-CoV-2, the virus  causing COVID-19, but do not rule out bacterial infection or co-infection  with other viruses  Laboratories within the [place] and its  territories are required to report all positive results to the appropriate  public health authorities  Negative results do not preclude SARS-CoV-2  infection and should not be used as the sole basis for treatment or other  patient management decisions  Negative results must be combined with  clinical observations, patient history, and epidemiological information  This test has not been FDA cleared or approved  This test has been authorized by FDA under an Emergency Use Authorization  (EUA)  This test is only authorized for the duration of time the  declaration that circumstances exist justifying the authorization of the  emergency use of an in vitro diagnostic tests for detection of SARS-CoV-2  virus and/or diagnosis of COVID-19 infection under section 564(b)(1) of  the Act, 21 U  S C  529STX-1(S)(1), unless the authorization is terminated  or revoked sooner  The test has been validated but independent review by FDA  and CLIA is pending  Test performed using [employer] GeneXpert: This RT-PCR assay targets N2,  a region unique to SARS-CoV-2  A conserved region in the E-gene was chosen  for pan-Sarbecovirus detection which includes SARS-CoV-2  Rapid drug screen, urine [[id number]]  (Normal) Collected: 04/09/22 0836    Lab Status: Final result Specimen: Urine, Clean Catch Updated: 04/09/22 0921     Amph/Meth UR Negative     Barbiturate Ur Negative     Benzodiazepine Urine Negative     Cocaine Urine Negative     Methadone Urine Negative     Opiate Urine Negative     PCP Ur Negative     THC Urine Negative     Oxycodone Urine Negative    Narrative:      FOR MEDICAL PURPOSES ONLY  IF CONFIRMATION NEEDED PLEASE CONTACT THE LAB WITHIN 5 DAYS      Drug Screen Cutoff Levels:  AMPHETAMINE/METHAMPHETAMINES  1000 ng/mL  BARBITURATES     200 ng/mL  BENZODIAZEPINES     200 ng/mL  COCAINE      300 ng/mL  METHADONE      300 ng/mL  OPIATES      300 ng/mL  PHENCYCLIDINE     25 ng/mL  THC       50 ng/mL  OXYCODONE      100 ng/mL    POCT pregnancy, urine [[id number]]  (Normal) Resulted: 04/09/22 0841    Lab Status: Final result Updated: 04/09/22 0841     EXT PREG TEST UR (Ref: Negative) Negative     Control valid    POCT alcohol breath test [[id number]]  (Normal) Resulted: 04/09/22 0834    Lab Status: Edited Result - FINAL Updated: 04/09/22 0835     EXTBreath Alcohol 0 000                 No orders to display              Procedures  Procedures         ED Course  ED Course as of 04/09/22 1747   Sat Apr 09, 2022   0941 Patient is medically stable for crisis evaluation and inpatient behavioral health admission if needed   [address] [place] [address] [address] interviewed the patient over the phone she does not have a good support system he does not really want to start medications she offer the patient a [place] [address] from crisis attempted to suggest a 201 patient declined will proceed with tele psychiatric consult  46 Dr Asha Arana of tele psych evaluated the patient  He does not feel inpatient psychiatric treatment is indicated    She has no thoughts of taking her own life and is very motivated for outpatient therapy to address her depression we will have Jolly from crisis review outpatient resources to help her gain access to outpatient psychotherapy   [facility] will review resources that were faxed over prior to discharge                                             MDM  Number of Diagnoses or Management Options  Grief reaction  Serous otitis media  Suicidal ideation  Diagnosis management comments: Mdm:  Patient undergo medical clearance exam this time she is only exhibiting a serous otitis media SI no plan      Disposition  Final diagnoses:   Suicidal ideation - no plan   Grief reaction Serous otitis media - bilateral   Depression     Time reflects when diagnosis was documented in both MDM as applicable and the Disposition within this note     Time User Action Codes Description Comment    4/9/2022  9:03 AM Thurmon Magyar Add [Q76 957] Suicidal ideation     4/9/2022  9:03 AM Thurmon Magyar Modify [O06 522] Suicidal ideation no plan    4/9/2022  9:03 AM Thurmon Magyar Add [F43 21] Grief reaction     4/9/2022  9:03 AM Thurmon Magyar Add [H65 90] Serous otitis media     4/9/2022  9:03 AM Thurmon Magyar Modify [H65 90] Serous otitis media bilateral    4/9/2022  1:45 PM Thurmon Magyar Add [place] Said  A] Depression       ED Disposition     ED Disposition Condition Date/Time Comment    Discharge Stable Sat Apr 9, 2022  1:45 PM Eunice Fabian discharge to home/self care              Follow-up Information     Follow up With Specialties Details Why [address] [facility] Go in 2 days recheck of symptoms [address] [address]  [phone]            Discharge Medication List as of 4/9/2022  3:32 PM      CONTINUE these medications which have NOT CHANGED    Details   acetaminophen (TYLENOL) 325 mg tablet Take 2 tablets (650 mg total) by mouth every 6 (six) hours as needed for mild pain or headaches, Starting Sun 3/25/2018, No Print      benzocaine-menthol-lanolin-aloe (DERMOPLAST) 20-0 5 % topical spray Apply 1 application topically 4 (four) times a day as needed for mild pain, Starting Sun 3/25/2018, No Print      cholecalciferol (VITAMIN D3) 25 mcg (1,000 units) tablet Take 1,000 Units by mouth daily, Historical Med      docusate sodium (COLACE) 100 mg capsule Take 100 mg by mouth 2 (two) times a day, Historical Med      ferrous sulfate 325 (65 Fe) mg tablet Take 325 mg by mouth daily with breakfast, Historical Med      fluticasone (FLONASE) 50 mcg/act nasal spray 1 spray into each nostril 2 (two) times a day for 14 days, Starting Fri 3/13/2020, Until Fri 3/27/2020, Normal      ibuprofen (MOTRIN) 200 mg tablet Take 3 tablets (600 mg total) by mouth every 6 (six) hours as needed for mild pain, Starting Sun 3/25/2018, No Print      meclizine (ANTIVERT) 12 5 MG tablet Take 1 tablet (12 5 mg total) by mouth 3 (three) times a day as needed for dizziness, Starting Mon 5/31/2021, Normal      Prenatal Vit-Fe Fumarate-FA (PRENATAL MULTIVITAMIN) 28-0 8 MG TABS Take 1 tablet by mouth daily, Historical Med      witch hazel-glycerin (TUCKS) topical pad Apply 1 pad topically as needed for irritation, Starting Sun 3/25/2018, No Print             No discharge procedures on file      PDMP Review     None          ED Provider  Electronically Signed by           Nadine Hernandez MD  04/09/22 9413

## 2022-04-09 NOTE — DISCHARGE INSTRUCTIONS
Can use over the counter decongestant like Mucinex-D generic OK have to ask pharmacist for medication to help your ears  Return with the ED if you are feeling overwhelmed or any new or worsening symptoms  Can call [facility] [phone]

## 2023-02-14 ENCOUNTER — TELEPHONE (OUTPATIENT)
Dept: OBGYN CLINIC | Facility: MEDICAL CENTER | Age: 33
End: 2023-02-14

## 2023-02-14 ENCOUNTER — ANNUAL EXAM (OUTPATIENT)
Dept: OBGYN CLINIC | Facility: MEDICAL CENTER | Age: 33
End: 2023-02-14

## 2023-02-14 VITALS
DIASTOLIC BLOOD PRESSURE: 82 MMHG | BODY MASS INDEX: 31.53 KG/M2 | HEIGHT: 61 IN | SYSTOLIC BLOOD PRESSURE: 122 MMHG | WEIGHT: 167 LBS

## 2023-02-14 DIAGNOSIS — Z30.46 ENCOUNTER FOR REMOVAL AND REINSERTION OF NEXPLANON: ICD-10-CM

## 2023-02-14 DIAGNOSIS — Z01.419 ENCOUNTER FOR WELL WOMAN EXAM WITH ROUTINE GYNECOLOGICAL EXAM: Primary | ICD-10-CM

## 2023-02-14 PROBLEM — Z30.41 ENCOUNTER FOR SURVEILLANCE OF CONTRACEPTIVE PILLS: Status: RESOLVED | Noted: 2018-08-15 | Resolved: 2023-02-14

## 2023-02-14 RX ORDER — BUPROPION HYDROCHLORIDE 150 MG/1
150 TABLET ORAL EVERY MORNING
COMMUNITY
Start: 2023-01-06

## 2023-02-14 RX ORDER — TOPIRAMATE 25 MG/1
25 TABLET ORAL 2 TIMES DAILY
COMMUNITY
Start: 2023-01-06

## 2023-02-14 NOTE — PROGRESS NOTES
Universal Protocol:  Consent: Verbal consent obtained  Written consent obtained    Risks and benefits: risks, benefits and alternatives were discussed  Consent given by: patient  Patient understanding: patient states understanding of the procedure being performed  Patient consent: the patient's understanding of the procedure matches consent given  Required items: required blood products, implants, devices, and special equipment available  Patient identity confirmed: verbally with patient    Remove and insert drug implant    Date/Time: 2/14/2023 2:03 PM  Performed by: Sergio Turner MD  Authorized by: Sergio Turner MD     Indication:     Indication: Presence of non-biodegradable drug delivery implant    Pre-procedure:     Prepped with: alcohol 70% and povidone-iodine    Procedure:     Procedure:  Removal with reinsertion    Left/right:  Left  Universal Protocol:  Risks and benefits: risks, benefits and alternatives were discussed  Consent given by: patient  Patient understanding: patient states understanding of the procedure being performed  Patient consent: the patient's understanding of the procedure matches consent given  Patient identity confirmed: verbally with patient    Remove and insert drug implant    Date/Time: 2/14/2023 2:03 PM  Performed by: Sergio Turner MD  Authorized by: Sergio Turner MD     Indication:     Indication: Insertion of non-biodegradable drug delivery implant    Pre-procedure:     Prepped with: alcohol 70% and povidone-iodine      Local anesthetic:  Lidocaine without epinephrine    The site was cleaned and prepped in a sterile fashion: yes    Procedure:     Left/right:  Left    Preloaded contraceptive capsule trocar was placed subdermally: yes      Visualization of implant was obtained: yes      Contraceptive capsule was inserted and trocar removed: yes      Visualization of notch in stylet and palpation of device: yes      Palpation confirms placement by provider and patient: yes

## 2023-02-14 NOTE — PROGRESS NOTES
[facility] [address] [place] [address]    ASSESSMENT/PLAN: Idris Estrada is a 29 y o  [id number] who presents for annual gynecologic exam     Encounter for routine gynecologic examination  - Routine well woman exam completed today  - Cervical Cancer Screening: Current ASCCP Guidelines reviewed  Last Pap: 2020  Next Pap Due: today  - HPV Vaccination status: Immunization series complete  - Contraceptive counseling discussed  Current contraception: Nexplanon replaced today     Additional problems addressed during this visit:  1  Encounter for well woman exam with routine gynecological exam  -     Liquid-based pap, screening        CC: Annual Gynecologic Examination    HPI: Idris Esrtada is a 29 y o  [id number] who presents for annual gynecologic examination  HPI  For routine annual exam, doing well; likes Nexplanon  Due to be replaced today    The following portions of the patient's history were reviewed and updated as appropriate: allergies, current medications, past family history, past medical history, obstetric history, gynecologic history, past social history, past surgical history and problem list     Review of Systems   Constitutional: Negative  HENT: Negative  Eyes: Negative  Respiratory: Negative  Cardiovascular: Negative  Gastrointestinal: Negative  Genitourinary: Negative  Musculoskeletal: Negative  All other systems reviewed and are negative  Objective:  /82 (BP Location: Left arm)   Ht 5' 1" (1 549 m)   Wt 75 8 kg (167 lb)   BMI 31 55 kg/m²    Physical Exam  Vitals reviewed  Constitutional:       General: She is not in acute distress  Appearance: She is well-developed  HENT:      Head: Normocephalic and atraumatic  Nose: Nose normal    Cardiovascular:      Rate and Rhythm: Normal rate  Pulmonary:      Effort: Pulmonary effort is normal  No respiratory distress     Chest:   Breasts:     Breasts are symmetrical       Right: Normal  No mass, nipple discharge, skin change or tenderness  Left: Normal  No mass, nipple discharge, skin change or tenderness  Abdominal:      General: There is no distension  Palpations: Abdomen is soft  There is no mass  Tenderness: There is no abdominal tenderness  There is no guarding or rebound  Genitourinary:     General: Normal vulva  Exam position: Lithotomy position  Labia:         Right: No lesion  Left: No lesion  Urethra: No prolapse (urethral meatus normal)  Vagina: Normal  No vaginal discharge, erythema or bleeding  Cervix: Normal       Uterus: Normal        Adnexa: Right adnexa normal and left adnexa normal    Musculoskeletal:         General: Normal range of motion  Cervical back: Normal range of motion  Lymphadenopathy:      Upper Body:      Right upper body: No supraclavicular, axillary or pectoral adenopathy  Left upper body: No supraclavicular, axillary or pectoral adenopathy  Lower Body: No right inguinal adenopathy  No left inguinal adenopathy  Skin:     General: Skin is warm and dry  Neurological:      Mental Status: She is alert and oriented to person, place, and time  Psychiatric:         Behavior: Behavior normal          Thought Content:  Thought content normal          Judgment: Judgment normal

## 2023-02-16 LAB
HPV HR 12 DNA CVX QL NAA+PROBE: NEGATIVE
HPV16 DNA CVX QL NAA+PROBE: NEGATIVE
HPV18 DNA CVX QL NAA+PROBE: NEGATIVE

## 2023-02-21 LAB
LAB AP GYN PRIMARY INTERPRETATION: NORMAL
Lab: NORMAL

## 2023-06-23 ENCOUNTER — OFFICE VISIT (OUTPATIENT)
Dept: URGENT CARE | Age: 33
End: 2023-06-23
Payer: COMMERCIAL

## 2023-06-23 ENCOUNTER — TELEPHONE (OUTPATIENT)
Dept: OBGYN CLINIC | Facility: OTHER | Age: 33
End: 2023-06-23

## 2023-06-23 VITALS
SYSTOLIC BLOOD PRESSURE: 130 MMHG | OXYGEN SATURATION: 98 % | HEART RATE: 94 BPM | DIASTOLIC BLOOD PRESSURE: 83 MMHG | TEMPERATURE: 96 F | RESPIRATION RATE: 18 BRPM

## 2023-06-23 DIAGNOSIS — M25.532 LEFT WRIST PAIN: ICD-10-CM

## 2023-06-23 DIAGNOSIS — H65.193 ACUTE MEE (MIDDLE EAR EFFUSION), BILATERAL: Primary | ICD-10-CM

## 2023-06-23 PROCEDURE — 99213 OFFICE O/P EST LOW 20 MIN: CPT

## 2023-06-23 PROCEDURE — S9088 SERVICES PROVIDED IN URGENT: HCPCS

## 2023-06-23 NOTE — PROGRESS NOTES
"  [facility]        NAME: Ricardo Collado is a 29 y o  female  : 1990    MRN: [id number]  DATE: 2023  TIME: 4:32 PM    Assessment and Plan   Acute SHANNAN (middle ear effusion), bilateral [H65 [id number]]  1  Acute SHANNAN (middle ear effusion), bilateral        2  Left wrist pain  Ambulatory Referral to Orthopedic Surgery        Patient presents for eval of b/l ear full feeling and \" drainage\" at times  Denies blood  Denies fevers  No meds taken  Also has c/o left wrist pain that has been ongoing   No specific injury/trauma  States was unable to go to work because of it and requesting note   Assessment notes small b/l effusions  Discussed OTC medications and treatment  Will refer to ortho as unable to write restrictions for work  Patient Instructions       Follow up with PCP as needed    Chief Complaint     Chief Complaint   Patient presents with   • Earache     B/l ear pain  \"Feels like water coming out\" Pain radiates to neck  Has been occurring for months but pain worse over the oast week  • Wrist Pain     Left wrist pain since yesterday  Woke up with pain  No noted injury  History of Present Illness       Patient presents for eval of b/l ear full feeling and \" drainage\" at times  Denies blood  Denies fevers  No meds taken  Also has c/o left wrist pain that has been ongoing   No specific injury/trauma  States was unable to go to work because of it and requesting note   Assessment notes small b/l effusions  Discussed OTC medications and treatment  Will refer to ortho as unable to write restrictions for work  Review of Systems   Review of Systems   HENT: Positive for ear discharge and hearing loss  Musculoskeletal: Positive for arthralgias  All other systems reviewed and are negative          Current Medications       Current Outpatient Medications:   •  buPROPion (WELLBUTRIN XL) 150 mg 24 hr tablet, Take 150 mg by mouth every morning (Patient not taking: Reported on " 4/13/2023), Disp: , Rfl:   •  cholecalciferol (VITAMIN D3) 25 mcg (1,000 units) tablet, Take 1,000 Units by mouth daily (Patient not taking: Reported on 4/13/2023), Disp: , Rfl:   •  docusate sodium (COLACE) 100 mg capsule, Take 100 mg by mouth 2 (two) times a day (Patient not taking: Reported on 4/13/2023), Disp: , Rfl:   •  ferrous sulfate 325 (65 Fe) mg tablet, Take 325 mg by mouth daily with breakfast (Patient not taking: Reported on 4/13/2023), Disp: , Rfl:   •  fluticasone (FLONASE) 50 mcg/act nasal spray, 1 spray into each nostril 2 (two) times a day for 14 days, Disp: 1 Bottle, Rfl: 0  •  Prenatal Vit-Fe Fumarate-FA (PRENATAL MULTIVITAMIN) 28-0 8 MG TABS, Take 1 tablet by mouth daily (Patient not taking: Reported on 4/13/2023), Disp: , Rfl:   •  topiramate (TOPAMAX) 25 mg tablet, Take 25 mg by mouth 2 (two) times a day (Patient not taking: Reported on 4/13/2023), Disp: , Rfl:     Current Allergies     Allergies as of 06/23/2023   • (No Known Allergies)            The following portions of the patient's history were reviewed and updated as appropriate: allergies, current medications, past family history, past medical history, past social history, past surgical history and problem list      Past Medical History:   Diagnosis Date   • IUD migration     misplaced IUD, last assessed 11/19/13       Past Surgical History:   Procedure Laterality Date   • NO PAST SURGERIES     • OTHER SURGICAL HISTORY      IUD removal, last assessed 12/8/15       Family History   Problem Relation Age of Onset   • Asthma Mother    • Heart attack Father    • Diabetes Maternal Grandmother    • Coronary artery disease Father    • Diabetes Family    • No Known Problems Sister    • No Known Problems Brother    • No Known Problems Son          Medications have been verified  Objective   /83   Pulse 94   Temp (!) 96 °F (35 6 °C) (Tympanic)   Resp 18   SpO2 98%   No LMP recorded         Physical Exam     Physical Exam  Vitals reviewed  Constitutional:       Appearance: Normal appearance  HENT:      Right Ear: A middle ear effusion is present  Left Ear: A middle ear effusion is present  Musculoskeletal:         General: No swelling, tenderness or signs of injury  Skin:     General: Skin is warm and dry  Findings: No bruising or erythema  Neurological:      Mental Status: She is alert

## 2023-06-23 NOTE — LETTER
June 23, 2023     Patient: Kevon Yu   YOB: 1990   Date of Visit: 6/23/2023       To Whom it May Concern:    Lety Diaz was seen in my clinic on 6/23/2023  She has been cleared to return to work   Further needs or work restrictions from orthopedic specialist if needed  If you have any questions or concerns, please don't hesitate to call           Sincerely,          TENA Nunez        CC: No Recipients

## 2023-06-23 NOTE — TELEPHONE ENCOUNTER
Patient is being referred to a orthopedics  Please schedule accordingly      [facility] [address]   [phone]

## 2023-08-14 ENCOUNTER — APPOINTMENT (OUTPATIENT)
Dept: RADIOLOGY | Facility: CLINIC | Age: 33
End: 2023-08-14
Payer: COMMERCIAL

## 2023-08-14 ENCOUNTER — OFFICE VISIT (OUTPATIENT)
Dept: OBGYN CLINIC | Facility: CLINIC | Age: 33
End: 2023-08-14
Payer: COMMERCIAL

## 2023-08-14 VITALS
DIASTOLIC BLOOD PRESSURE: 80 MMHG | BODY MASS INDEX: 30.21 KG/M2 | SYSTOLIC BLOOD PRESSURE: 124 MMHG | WEIGHT: 160 LBS | HEIGHT: 61 IN

## 2023-08-14 DIAGNOSIS — M25.532 LEFT WRIST PAIN: ICD-10-CM

## 2023-08-14 DIAGNOSIS — S60.212A CONTUSION OF LEFT WRIST, INITIAL ENCOUNTER: Primary | ICD-10-CM

## 2023-08-14 PROCEDURE — 99203 OFFICE O/P NEW LOW 30 MIN: CPT | Performed by: FAMILY MEDICINE

## 2023-08-14 PROCEDURE — 73110 X-RAY EXAM OF WRIST: CPT

## 2023-08-14 NOTE — PATIENT INSTRUCTIONS
I explained to patient that she has tenderness along the ulnar styloid as well as TFCC which could be due to contusion or tear of the TFCC. Recommended conservative treatment including physical therapy as well as bracing.   If no improvement we will consider MRI arthrogram of the left wrist.

## 2023-08-14 NOTE — PROGRESS NOTES
1. Contusion of left wrist, initial encounter  Ambulatory Referral to Occupational Therapy      2. Left wrist pain  Ambulatory Referral to Orthopedic Surgery    XR wrist 3+ vw left    Brace    Ambulatory Referral to Occupational Therapy        Orders Placed This Encounter   Procedures   • Brace   • XR wrist 3+ vw left   • Ambulatory Referral to Occupational Therapy        IMAGING STUDIES: (I personally reviewed images in PACS and report):  Xray left Wrist 8/14/2023: No acute osseous normality      PAST REPORTS:         ASSESSMENT/PLAN:  Left wrist ulnar-sided contusion  Differential diagnosis:  TFCC tear  ECU tendinitis    Repeat X-ray next visit: None    Return if symptoms worsen or fail to improve. Patient instructions below verbally summarized in person during encounter:  Patient Instructions   I explained to patient that she has tenderness along the ulnar styloid as well as TFCC which could be due to contusion or tear of the TFCC. Recommended conservative treatment including physical therapy as well as bracing. If no improvement we will consider MRI arthrogram of the left wrist.        __________________________________________________________________________    HISTORY OF PRESENT ILLNESS:  Left wrist pain for 2 months after falling and striking her ulnar wrist against bathtub when cleaning bathroom. Constant sore pressure. Mild pain. Worse with bending and twisting. No numbness or tingling.            Review of Systems      Following history reviewed and update:    Past Medical History:   Diagnosis Date   • IUD migration     misplaced IUD, last assessed 11/19/13     Past Surgical History:   Procedure Laterality Date   • NO PAST SURGERIES     • OTHER SURGICAL HISTORY      IUD removal, last assessed 12/8/15     Social History   Social History     Substance and Sexual Activity   Alcohol Use No     Social History     Substance and Sexual Activity   Drug Use No     Social History     Tobacco Use   Smoking Status Never   Smokeless Tobacco Never     Family History   Problem Relation Age of Onset   • Asthma Mother    • Heart attack Father    • Diabetes Maternal Grandmother    • Coronary artery disease Father    • Diabetes Family    • No Known Problems Sister    • No Known Problems Brother    • No Known Problems Son      No Known Allergies       Physical Exam  /80   Ht 5' 1" (1.549 m)   Wt 72.6 kg (160 lb)   BMI 30.23 kg/m²     Constitutional:  see vital signs  Gen: well-developed, normocephalic/atraumatic, well-groomed  Eyes: No inflammation or discharge of conjunctiva or lids; sclera clear   Pharynx: no inflammation, lesion, or mass of lips  Neck: supple, no masses, non-distended  MSK: no inflammation, lesion, mass, or clubbing of nails and digits except for other than mentioned below  SKIN: no visible rashes or skin lesions  Pulmonary/Chest: Effort normal. No respiratory distress.    NEURO: cranial nerves grossly intact  PSYCH:  Alert and oriented to person, place, and time; recent and remote memory intact; mood normal, no depression, anxiety, or agitation, judgment and insight good and intact     Ortho Exam  Left Elbow:  rom full  nontender    Left Wrist  no swelling, erythema, or increased warmth  rom full  +tenderness ulnar styloid  no laxity of joint; druj stable    Left Hand  no erythema  Swelling: none  Tenderness: none  rom fingers mcp, pip, dip intact without pain  No digital scissoring or deviation of fingers  no extensor lag  no rotation of fingers  no joint laxity  strenght flexion and extension mcp, pip, dip 5/5  sensation intact  capillary refill intact   Froment sign:  normal  OK sign:  Normal  Thumb extension:  5/5   __________________________________________________________________________  Procedures

## 2023-08-29 ENCOUNTER — EVALUATION (OUTPATIENT)
Dept: OCCUPATIONAL THERAPY | Facility: CLINIC | Age: 33
End: 2023-08-29
Payer: COMMERCIAL

## 2023-08-29 DIAGNOSIS — M25.532 LEFT WRIST PAIN: ICD-10-CM

## 2023-08-29 DIAGNOSIS — S60.212A CONTUSION OF LEFT WRIST, INITIAL ENCOUNTER: ICD-10-CM

## 2023-08-29 PROCEDURE — 97165 OT EVAL LOW COMPLEX 30 MIN: CPT | Performed by: OCCUPATIONAL THERAPIST

## 2023-08-29 NOTE — PROGRESS NOTES
OT Evaluation     Today's date: 2023  Patient name: Jones Herbert  : 1990  MRN: [id number]  Referring provider: Justyn Lo  Dx:   Encounter Diagnosis     ICD-10-CM    1. Left wrist pain  M25.532 Ambulatory Referral to Occupational Therapy      2. Contusion of left wrist, initial encounter  [address] [address] Ambulatory Referral to Occupational Therapy                     Assessment  Assessment details: Sera Chacko presents with with L wrist pain following a dorsal wrist contusion 2 mos ago . She has no pain at rest but significant pain with use. She has mild loss of wrist ROM. She has mild edema. She has weak  . She has pain with lifting, wt bearing , bottles, jars . 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 She has 2 children at home . Impairments: abnormal or restricted ROM, activity intolerance, impaired physical strength, lacks appropriate home exercise program, pain with function and weight-bearing intolerance  Functional limitations: pain and limitation with lifting , wt bearing , bottles/jars   Symptom irritability: moderateUnderstanding of Dx/Px/POC: good   Prognosis: good    Goals  STG- 1 wk  1- I with HEP  2- improve worst pain to 6/10    LTG- 6 wks  1- no pain L wrist  2- RTW full duty  3- lift , wt bear , open jars without limitation  4- wrist e/f = 70/70      Plan  Patient would benefit from: OT eval and skilled occupational therapy  Planned modality interventions: cryotherapy and thermotherapy: hydrocollator packs  Planned therapy interventions: activity modification, ADL retraining, joint mobilization, kinesiology taping, IASTM, manual therapy, massage, graded exercise, graded motor, home exercise program, stretching, therapeutic activities, therapeutic training, flexibility and therapeutic exercise  Frequency: 1-2x wk.   Duration in weeks: 6  Plan of Care beginning date: 2023  Treatment plan discussed with: patient        Subjective Evaluation    History of Present Illness  Date of onset: 2023  Mechanism of injury: Samy Rodriguez fell and hit the ulnar side of her Left wrist and forearm on her bathtub. She was seen at Ortho. Xrays were negative. Quality of life: good    Patient Goals  Patient goals for therapy: decreased pain, decreased edema, increased strength, independence with ADLs/IADLs and return to work  Patient goal: no pain L wrist   Pain  Current pain ratin  At best pain ratin  At worst pain rating: 10  Location: L ulnar   Quality: dull ache and sharp  Relieving factors: rest and support  Aggravating factors: lifting (driving , wt bearing . )  Progression: no change    Social Support  Steps to enter house: yes  Stairs in house: yes   Lives in: multiple-level home  Lives with: young children    Employment status: working (on leave)  Hand dominance: right    Treatments  Previous treatment: immobilization  Current treatment: immobilization        Objective     Tenderness     Additional Tenderness Details  Tender L DRUJ, TFCC , UT , ECU     Neurological Testing     Additional Neurological Details  Denies paresthesias     Active Range of Motion     Right Elbow   Forearm supination: 70 degrees   Forearm pronation: 60 degrees     Left Wrist   Wrist flexion: 64 degrees   Wrist extension: 66 degrees   Radial deviation: 20 degrees with pain  Ulnar deviation: 38 degrees     Left Thumb   Kapandji score: 10 degrees      Additional Active Range of Motion Details  WNL     Strength/Myotome Testing     Left Wrist/Hand   Normal wrist strength     (2nd hand position)     Trial 1: 44    Thumb Strength  Key/Lateral Pinch     Trial 1: 16  Palmar/Three-Point Pinch     Trial 1: 9    Right Wrist/Hand      (2nd hand position)     Trial 1: 66    Thumb Strength   Key/Lateral Pinch     Trial 1: 16  Palmar/Three-Point Pinch     Trial 1: 10    Tests     Left Wrist/Hand   Negative distal radial-ulnar joint stress.      Swelling     Left Wrist/Hand   Circumference wrist: 15.8 cm    Right Wrist/Hand   Circumference wrist: 15.5 cm             Precautions: universal       Manuals 8/29  IE            IASTM DW 4m            MOB wrist  4m            MSM midcarpal e/f 2m                          HEP 4x day            KT tape wr ext  apply                                                                                          Ther Ex             A/PROM L wrist  2m            Wrist ROM W/cone 2m            A/PROM P/S 2m                                                                             Ther Activity             Pegs  neutral wrist  30x                         Gait Training                                       Modalities             MH 6m            CP  5m

## 2023-09-06 ENCOUNTER — OFFICE VISIT (OUTPATIENT)
Dept: OCCUPATIONAL THERAPY | Facility: CLINIC | Age: 33
End: 2023-09-06
Payer: COMMERCIAL

## 2023-09-06 DIAGNOSIS — M25.532 LEFT WRIST PAIN: Primary | ICD-10-CM

## 2023-09-06 DIAGNOSIS — S60.212A CONTUSION OF LEFT WRIST, INITIAL ENCOUNTER: ICD-10-CM

## 2023-09-06 PROCEDURE — 97140 MANUAL THERAPY 1/> REGIONS: CPT | Performed by: OCCUPATIONAL THERAPIST

## 2023-09-06 PROCEDURE — 97110 THERAPEUTIC EXERCISES: CPT | Performed by: OCCUPATIONAL THERAPIST

## 2023-09-08 ENCOUNTER — OFFICE VISIT (OUTPATIENT)
Dept: OCCUPATIONAL THERAPY | Facility: CLINIC | Age: 33
End: 2023-09-08
Payer: COMMERCIAL

## 2023-09-08 DIAGNOSIS — S60.212A CONTUSION OF LEFT WRIST, INITIAL ENCOUNTER: ICD-10-CM

## 2023-09-08 DIAGNOSIS — M25.532 LEFT WRIST PAIN: Primary | ICD-10-CM

## 2023-09-08 PROCEDURE — 97110 THERAPEUTIC EXERCISES: CPT | Performed by: OCCUPATIONAL THERAPIST

## 2023-09-08 PROCEDURE — 97140 MANUAL THERAPY 1/> REGIONS: CPT | Performed by: OCCUPATIONAL THERAPIST

## 2023-09-08 NOTE — PROGRESS NOTES
Daily Note     Today's date: 2023  Patient name: Bhavani Ness  : 1990  MRN: [id number]  Referring provider: Waleska Solomon  Dx:   Encounter Diagnosis     ICD-10-CM    1. Left wrist pain  M25.532       2. Contusion of left wrist, initial encounter  S60.212A                      Subjective: I am feeling better      Objective: See treatment diary below      Assessment: Tolerated treatment well. Patient has improved ROM       Plan: Continue per plan of care.       Precautions: universal       Manuals   IE           IASTM DW 4m 4m 4m          MOB wrist  4m 4m 4m          MSM midcarpal e/f 2m 2m 2m                        HEP 4x day            KT tape wr ext  apply                                                                                          Ther Ex             A/PROM L wrist  2m 2m 2m          Wrist ROM W/cone 2m 3x10 3x10          A/PROM P/S 2m Dowel 3x10 sm hammer  3x10          Gripping- nuetral  RPW 2x30 BPW  2x30          Wrist e/f   1#  3x10                                                 Ther Activity             Pegs  neutral wrist  30x            Pinch pins w/rot   Red  3 sets          Gait Training                                       Modalities             MH 6m 8m 8m          CP  5m

## 2023-09-11 ENCOUNTER — APPOINTMENT (OUTPATIENT)
Dept: OCCUPATIONAL THERAPY | Facility: CLINIC | Age: 33
End: 2023-09-11
Payer: COMMERCIAL

## 2023-09-14 ENCOUNTER — OFFICE VISIT (OUTPATIENT)
Dept: OCCUPATIONAL THERAPY | Facility: CLINIC | Age: 33
End: 2023-09-14
Payer: COMMERCIAL

## 2023-09-14 DIAGNOSIS — M25.532 LEFT WRIST PAIN: Primary | ICD-10-CM

## 2023-09-14 DIAGNOSIS — S60.212A CONTUSION OF LEFT WRIST, INITIAL ENCOUNTER: ICD-10-CM

## 2023-09-14 PROCEDURE — 97110 THERAPEUTIC EXERCISES: CPT | Performed by: OCCUPATIONAL THERAPIST

## 2023-09-14 PROCEDURE — 97140 MANUAL THERAPY 1/> REGIONS: CPT | Performed by: OCCUPATIONAL THERAPIST

## 2023-09-14 NOTE — PROGRESS NOTES
Daily Note     Today's date: 2023  Patient name: Caity Garcia  : 1990  MRN: [id number]  Referring provider: Gene Catalan  Dx:   Encounter Diagnosis     ICD-10-CM    1. Left wrist pain  M25.532       2. Contusion of left wrist, initial encounter  S60.212A                      Subjective: some soreness       Objective: See treatment diary below      Assessment: Tolerated treatment well. Patient has mild pain dorsal radial wrist .      60#, previous 44#    Plan: Continue per plan of care.       Precautions: universal       Manuals   IE          IASTM DW 4m 4m 4m 4m         MOB wrist  4m 4m 4m 4m         MSM midcarpal e/f 2m 2m 2m 2m                       HEP 4x day            KT tape wr ext  apply                                                                                          Ther Ex             A/PROM L wrist  2m 2m 2m 2m         Wrist ROM W/cone 2m 3x10 3x10          A/PROM P/S 2m Dowel 3x10 sm hammer  3x10 hammer  3x10         Gripping- nuetral  RPW 2x30 BPW  2x30 BPW  2x30         Wrist e/f   1#  3x10 1#  3x10         Wrist stab     2.2#  Bal  1m                                    Ther Activity             Pegs  neutral wrist  30x            Pinch pins w/rot   Red  3 sets Red  3 sets         Gait Training                                       Modalities             MH 6m 8m 8m 8m         CP  5m

## 2023-09-18 ENCOUNTER — APPOINTMENT (OUTPATIENT)
Dept: OCCUPATIONAL THERAPY | Facility: CLINIC | Age: 33
End: 2023-09-18
Payer: COMMERCIAL

## 2023-09-21 ENCOUNTER — APPOINTMENT (OUTPATIENT)
Dept: OCCUPATIONAL THERAPY | Facility: CLINIC | Age: 33
End: 2023-09-21
Payer: COMMERCIAL

## 2023-09-25 ENCOUNTER — OFFICE VISIT (OUTPATIENT)
Dept: URGENT CARE | Facility: CLINIC | Age: 33
End: 2023-09-25
Payer: COMMERCIAL

## 2023-09-25 VITALS
OXYGEN SATURATION: 98 % | BODY MASS INDEX: 30.21 KG/M2 | SYSTOLIC BLOOD PRESSURE: 126 MMHG | TEMPERATURE: 98.2 F | RESPIRATION RATE: 16 BRPM | HEIGHT: 61 IN | HEART RATE: 74 BPM | DIASTOLIC BLOOD PRESSURE: 80 MMHG | WEIGHT: 160 LBS

## 2023-09-25 DIAGNOSIS — K04.7 DENTAL ABSCESS: Primary | ICD-10-CM

## 2023-09-25 PROCEDURE — 99213 OFFICE O/P EST LOW 20 MIN: CPT | Performed by: PHYSICIAN ASSISTANT

## 2023-09-25 PROCEDURE — S9088 SERVICES PROVIDED IN URGENT: HCPCS | Performed by: PHYSICIAN ASSISTANT

## 2023-09-25 RX ORDER — AMOXICILLIN AND CLAVULANATE POTASSIUM 875; 125 MG/1; MG/1
1 TABLET, FILM COATED ORAL EVERY 12 HOURS SCHEDULED
Qty: 14 TABLET | Refills: 0 | Status: SHIPPED | OUTPATIENT
Start: 2023-09-25 | End: 2023-10-02

## 2023-09-25 NOTE — PROGRESS NOTES
[facility]        NAME: Dona Edgar is a 34 y.o. female  : 1990    MRN: [id number]  DATE: 2023  TIME: 11:59 AM    Assessment and Plan   Dental abscess [K04.7]  1. Dental abscess  amoxicillin-clavulanate (AUGMENTIN) 875-125 mg per tablet            Patient Instructions     Take antibiotics as directed   Follow up with dentist as scheduled  May take Motrin 3 pills 4 x day and Tylenol 2 pills 3 x day as needed for pain  Follow up with PCP in 3-5 days. Proceed to  ER if symptoms worsen. Chief Complaint     Chief Complaint   Patient presents with   • Dental Pain     Pt reports right sided upper mandible tooth pain with onset two days ago. States made a dental appointment on 10/04/2023. Not currently managing with otc medication. History of Present Illness       Dental Pain   This is a new problem. Episode onset: Saturday. The problem occurs constantly. The problem has been unchanged. Associated symptoms include facial pain. Pertinent negatives include no fever. Associated symptoms comments: Unable to chew on the right side. She has tried nothing for the symptoms. She localizes the pain to the right upper jaw area. She denies known broken tooth or dental caries. She does appreciate gum swelling. Review of Systems   Review of Systems   Constitutional: Negative for chills and fever. HENT: Positive for dental problem. Negative for drooling, ear pain, facial swelling and sore throat. Eyes: Negative for pain and visual disturbance. Respiratory: Negative for cough and shortness of breath. Cardiovascular: Negative for chest pain and palpitations. Gastrointestinal: Negative for abdominal pain and vomiting. Genitourinary: Negative for dysuria and hematuria. Musculoskeletal: Negative for arthralgias and back pain. Skin: Negative for color change and rash. Neurological: Negative for seizures and syncope.    All other systems reviewed and are negative.         Current Medications       Current Outpatient Medications:   •  amoxicillin-clavulanate (AUGMENTIN) 875-125 mg per tablet, Take 1 tablet by mouth every 12 (twelve) hours for 7 days, Disp: 14 tablet, Rfl: 0  •  buPROPion (WELLBUTRIN XL) 150 mg 24 hr tablet, Take 150 mg by mouth every morning (Patient not taking: Reported on 4/13/2023), Disp: , Rfl:   •  cholecalciferol (VITAMIN D3) 25 mcg (1,000 units) tablet, Take 1,000 Units by mouth daily (Patient not taking: Reported on 4/13/2023), Disp: , Rfl:   •  docusate sodium (COLACE) 100 mg capsule, Take 100 mg by mouth 2 (two) times a day (Patient not taking: Reported on 4/13/2023), Disp: , Rfl:   •  ferrous sulfate 325 (65 Fe) mg tablet, Take 325 mg by mouth daily with breakfast (Patient not taking: Reported on 4/13/2023), Disp: , Rfl:   •  fluticasone (FLONASE) 50 mcg/act nasal spray, 1 spray into each nostril 2 (two) times a day for 14 days, Disp: 1 Bottle, Rfl: 0  •  Prenatal Vit-Fe Fumarate-FA (PRENATAL MULTIVITAMIN) 28-0.8 MG TABS, Take 1 tablet by mouth daily (Patient not taking: Reported on 4/13/2023), Disp: , Rfl:   •  topiramate (TOPAMAX) 25 mg tablet, Take 25 mg by mouth 2 (two) times a day (Patient not taking: Reported on 4/13/2023), Disp: , Rfl:     Current Allergies     Allergies as of 09/25/2023   • (No Known Allergies)            The following portions of the patient's history were reviewed and updated as appropriate: allergies, current medications, past family history, past medical history, past social history, past surgical history and problem list.     Past Medical History:   Diagnosis Date   • IUD migration     misplaced IUD, last assessed 11/19/13       Past Surgical History:   Procedure Laterality Date   • NO PAST SURGERIES     • OTHER SURGICAL HISTORY      IUD removal, last assessed 12/8/15       Family History   Problem Relation Age of Onset   • Asthma Mother    • Heart attack Father    • Diabetes Maternal Grandmother    • Coronary artery disease Father    • Diabetes Family    • No Known Problems Sister    • No Known Problems Brother    • No Known Problems Son          Medications have been verified. Objective   /80 (BP Location: Right arm, Patient Position: Sitting)   Pulse 74   Temp 98.2 °F (36.8 °C)   Resp 16   Ht 5' 1" (1.549 m)   Wt 72.6 kg (160 lb)   SpO2 98%   BMI 30.23 kg/m²   No LMP recorded. Physical Exam     Physical Exam  Vitals and nursing note reviewed. Constitutional:       General: She is not in acute distress. Appearance: Normal appearance. HENT:      Head: Normocephalic and atraumatic. Mouth/Throat:      Mouth: Mucous membranes are moist.      Comments: Dental caries visualized on right upper teeth. Swelling in the gum line above this area which is tender to palpation - no fluctuance appreciated  Cardiovascular:      Rate and Rhythm: Normal rate and regular rhythm. Pulses: Normal pulses. Heart sounds: Normal heart sounds. Pulmonary:      Effort: Pulmonary effort is normal.      Breath sounds: Normal breath sounds. Musculoskeletal:      Cervical back: No tenderness. Lymphadenopathy:      Cervical: No cervical adenopathy. Skin:     General: Skin is warm and dry. Neurological:      Mental Status: She is alert and oriented to person, place, and time.    Psychiatric:         Mood and Affect: Mood normal.         Behavior: Behavior normal.

## 2023-09-25 NOTE — PATIENT INSTRUCTIONS
Take antibiotics as directed   Follow up with dentist as scheduled  May take Motrin 3 pills 4 x day and Tylenol 2 pills 3 x day as needed for pain  Follow up with PCP in 3-5 days. Proceed to  ER if symptoms worsen.

## 2024-02-21 PROBLEM — Z01.419 ENCOUNTER FOR ANNUAL ROUTINE GYNECOLOGICAL EXAMINATION: Status: RESOLVED | Noted: 2018-08-15 | Resolved: 2024-02-21

## 2024-05-23 ENCOUNTER — VBI (OUTPATIENT)
Dept: ADMINISTRATIVE | Facility: OTHER | Age: 34
End: 2024-05-23

## 2024-07-19 ENCOUNTER — OFFICE VISIT (OUTPATIENT)
Dept: URGENT CARE | Facility: CLINIC | Age: 34
End: 2024-07-19
Payer: COMMERCIAL

## 2024-07-19 VITALS
TEMPERATURE: 97.9 F | SYSTOLIC BLOOD PRESSURE: 144 MMHG | DIASTOLIC BLOOD PRESSURE: 92 MMHG | HEART RATE: 80 BPM | OXYGEN SATURATION: 100 % | RESPIRATION RATE: 16 BRPM

## 2024-07-19 DIAGNOSIS — K21.9 GASTROESOPHAGEAL REFLUX DISEASE, UNSPECIFIED WHETHER ESOPHAGITIS PRESENT: Primary | ICD-10-CM

## 2024-07-19 LAB — GLUCOSE SERPL-MCNC: 92 MG/DL (ref 65–140)

## 2024-07-19 PROCEDURE — S9088 SERVICES PROVIDED IN URGENT: HCPCS | Performed by: PHYSICIAN ASSISTANT

## 2024-07-19 PROCEDURE — 99213 OFFICE O/P EST LOW 20 MIN: CPT | Performed by: PHYSICIAN ASSISTANT

## 2024-07-19 PROCEDURE — 82948 REAGENT STRIP/BLOOD GLUCOSE: CPT | Performed by: PHYSICIAN ASSISTANT

## 2024-07-19 RX ORDER — OMEPRAZOLE 20 MG/1
20 TABLET, DELAYED RELEASE ORAL 2 TIMES DAILY
Qty: 28 TABLET | Refills: 0 | Status: SHIPPED | OUTPATIENT
Start: 2024-07-19 | End: 2024-08-02

## 2024-07-19 NOTE — PATIENT INSTRUCTIONS
Take TUMS as directed  Start Prilosec as directed  Eat a very bland diet  Proceed to ER immediately if you are vomiting blood - bright red or coffee ground appearance or diarrhea becomes bloody or tar like  Follow up with PCP in 3-5 days.  Proceed to  ER if symptoms worsen.    If tests have been performed at [facility], our office will contact you with results if changes need to be made to the care plan discussed with you at the visit.  You can review your full results on [facility] MyChart.

## 2024-07-19 NOTE — PROGRESS NOTES
[facility]        NAME: Aster Skinner is a 33 y.o. female  : 1990    MRN: [id number]  DATE: 2024  TIME: 2:54 PM    Assessment and Plan   Gastroesophageal reflux disease, unspecified whether esophagitis present [K21.9]  1. Gastroesophageal reflux disease, unspecified whether esophagitis present  omeprazole (PriLOSEC OTC) 20 MG tablet            Patient Instructions   I discussed that based upon her history and exam she is experiencing GERD which is causing the belching and epigastric pain.  Take TUMS and start Prilosec.  Eat a very bland diet, continue to stay hydrated.  Go to ER immediately if pain worsens or any bloody vomitus or diarrhea.  Follow up with PCP.   Take TUMS as directed  Start Prilosec as directed  Eat a very bland diet  Proceed to ER immediately if you are vomiting blood - bright red or coffee ground appearance or diarrhea becomes bloody or tar like  Follow up with PCP in 3-5 days.  Proceed to  ER if symptoms worsen.    If tests have been performed at [facility], our office will contact you with results if changes need to be made to the care plan discussed with you at the visit.  [facility]    Chief Complaint     Chief Complaint   Patient presents with    Heartburn     Patient with upper abdominal pain, heartburn, and diarrhea x1 day. Patient states loss of apatite, and nausea as well. Patient also states increased burping with burning sensation in the upper abdominal area.          History of Present Illness       Abdominal Pain  This is a new problem. The current episode started yesterday. The onset quality is sudden. The problem has been gradually worsening. The pain is located in the epigastric region. The quality of the pain is aching. The abdominal pain does not radiate. Associated symptoms include belching, diarrhea and vomiting. Pertinent negatives include no arthralgias, constipation, dysuria, fever, flatus,  hematuria, melena or nausea. She has tried nothing for the symptoms. There is no history of abdominal surgery, colon cancer, Crohn's disease, gallstones or irritable bowel syndrome.   She states she ate [employer] around 10:30 pm last night.  At around midnight she developed severe heartburn and shortly after started vomiting.  She states she has vomited a total of 7 times today.  Initially vomitus was brown in appearance, now just small amounts of yellow/clear fluid.  She has had 2 episodes of diarrhea since this morning -normal stool color without blood/melena.  She c/o constant epigastric pain and belching.  She states she is not really nauseous, the belching seems to be making her vomit.  She ate a piece of toast this morning she was able to keep down.  She reports h/o GERD during pregnancy.    She denies fever/chills.    PMH: non contributory  Review of Systems   Review of Systems   Constitutional:  Negative for chills and fever.   HENT:  Negative for ear pain and sore throat.    Eyes:  Negative for pain and visual disturbance.   Respiratory:  Negative for cough and shortness of breath.    Cardiovascular:  Negative for chest pain and palpitations.   Gastrointestinal:  Positive for abdominal pain, diarrhea and vomiting. Negative for abdominal distention, blood in stool, constipation, flatus, melena and nausea.   Genitourinary:  Negative for dysuria and hematuria.   Musculoskeletal:  Negative for arthralgias and back pain.   Skin:  Negative for color change and rash.   Neurological:  Negative for seizures and syncope.   All other systems reviewed and are negative.        Current Medications       Current Outpatient Medications:     omeprazole (PriLOSEC OTC) 20 MG tablet, Take 1 tablet (20 mg total) by mouth 2 (two) times a day for 14 days, Disp: 28 tablet, Rfl: 0    buPROPion (WELLBUTRIN XL) 150 mg 24 hr tablet, Take 150 mg by mouth every morning (Patient not taking: Reported on 4/13/2023), Disp: , Rfl:      cholecalciferol (VITAMIN D3) 25 mcg (1,000 units) tablet, Take 1,000 Units by mouth daily (Patient not taking: Reported on 4/13/2023), Disp: , Rfl:     docusate sodium (COLACE) 100 mg capsule, Take 100 mg by mouth 2 (two) times a day (Patient not taking: Reported on 4/13/2023), Disp: , Rfl:     ferrous sulfate 325 (65 Fe) mg tablet, Take 325 mg by mouth daily with breakfast (Patient not taking: Reported on 4/13/2023), Disp: , Rfl:     fluticasone (FLONASE) 50 mcg/act nasal spray, 1 spray into each nostril 2 (two) times a day for 14 days, Disp: 1 Bottle, Rfl: 0    Prenatal Vit-Fe Fumarate-FA (PRENATAL MULTIVITAMIN) 28-0.8 MG TABS, Take 1 tablet by mouth daily (Patient not taking: Reported on 4/13/2023), Disp: , Rfl:     topiramate (TOPAMAX) 25 mg tablet, Take 25 mg by mouth 2 (two) times a day (Patient not taking: Reported on 4/13/2023), Disp: , Rfl:     Current Allergies     Allergies as of 07/19/2024    (No Known Allergies)            The following portions of the patient's history were reviewed and updated as appropriate: allergies, current medications, past family history, past medical history, past social history, past surgical history and problem list.     Past Medical History:   Diagnosis Date    IUD migration     misplaced IUD, last assessed 11/19/13       Past Surgical History:   Procedure Laterality Date    NO PAST SURGERIES      OTHER SURGICAL HISTORY      IUD removal, last assessed 12/8/15       Family History   Problem Relation Age of Onset    Asthma Mother     Heart attack Father     Diabetes Maternal Grandmother     Coronary artery disease Father     Diabetes Family     No Known Problems Sister     No Known Problems Brother     No Known Problems Son          Medications have been verified.        Objective   /92   Pulse 80   Temp 97.9 °F (36.6 °C)   Resp 16   SpO2 100%   No LMP recorded.       Physical Exam     Physical Exam  Vitals and nursing note reviewed.   Constitutional:       General:  She is not in acute distress.     Appearance: Normal appearance.   HENT:      Head: Normocephalic and atraumatic.      Mouth/Throat:      Mouth: Mucous membranes are moist.      Pharynx: No posterior oropharyngeal erythema.   Eyes:      Conjunctiva/sclera: Conjunctivae normal.   Cardiovascular:      Rate and Rhythm: Normal rate and regular rhythm.      Pulses: Normal pulses.      Heart sounds: Normal heart sounds.   Pulmonary:      Effort: Pulmonary effort is normal.      Breath sounds: Normal breath sounds.   Abdominal:      Comments: Bowel sounds slightly hyperactive.    No splenomegaly, no hepatomegaly  She has mild LUQ tenderness - no rebound, no guarding  She has mild to moderate epigastric tenderness without rebound or guarding.     Skin:     General: Skin is warm and dry.   Neurological:      Mental Status: She is alert and oriented to person, place, and time.   Psychiatric:         Mood and Affect: Mood normal.         Behavior: Behavior normal.         POCT glucose: 92